# Patient Record
Sex: FEMALE | Race: WHITE | Employment: UNEMPLOYED | URBAN - METROPOLITAN AREA
[De-identification: names, ages, dates, MRNs, and addresses within clinical notes are randomized per-mention and may not be internally consistent; named-entity substitution may affect disease eponyms.]

---

## 2017-08-07 ENCOUNTER — HOSPITAL ENCOUNTER (OUTPATIENT)
Dept: OTHER | Age: 57
Discharge: HOME | End: 2017-08-07
Attending: SURGERY | Admitting: SURGERY

## 2017-08-07 ENCOUNTER — OFFICE VISIT (OUTPATIENT)
Dept: SURGERY | Age: 57
End: 2017-08-07

## 2017-08-07 VITALS — DIASTOLIC BLOOD PRESSURE: 82 MMHG | SYSTOLIC BLOOD PRESSURE: 121 MMHG | HEART RATE: 79 BPM

## 2017-08-07 DIAGNOSIS — N63.20 LEFT BREAST MASS: Primary | ICD-10-CM

## 2017-08-07 DIAGNOSIS — R92.8 ABNORMAL MAMMOGRAM, UNSPECIFIED: ICD-10-CM

## 2017-08-07 PROCEDURE — 99204 OFFICE O/P NEW MOD 45 MIN: CPT | Performed by: SURGERY

## 2017-08-07 RX ORDER — BACITRACIN, NEOMYCIN, POLYMYXIN B 400; 3.5; 5 [USP'U]/G; MG/G; [USP'U]/G
OINTMENT TOPICAL ONCE
Status: COMPLETED | OUTPATIENT
Start: 2017-08-07 | End: 2017-08-07

## 2017-08-07 RX ORDER — LIDOCAINE HYDROCHLORIDE 10 MG/ML
5 INJECTION, SOLUTION EPIDURAL; INFILTRATION; INTRACAUDAL; PERINEURAL ONCE
Status: COMPLETED | OUTPATIENT
Start: 2017-08-07 | End: 2017-08-07

## 2017-08-07 RX ORDER — LIDOCAINE HYDROCHLORIDE AND EPINEPHRINE 20; 5 MG/ML; UG/ML
20 INJECTION, SOLUTION EPIDURAL; INFILTRATION; INTRACAUDAL; PERINEURAL ONCE
Status: COMPLETED | OUTPATIENT
Start: 2017-08-07 | End: 2017-08-07

## 2017-08-07 RX ADMIN — BACITRACIN, NEOMYCIN, POLYMYXIN B: 400; 3.5; 5 OINTMENT TOPICAL at 14:11

## 2017-08-07 RX ADMIN — LIDOCAINE HYDROCHLORIDE 5 ML: 10 INJECTION, SOLUTION EPIDURAL; INFILTRATION; INTRACAUDAL; PERINEURAL at 14:00

## 2017-08-07 RX ADMIN — LIDOCAINE HYDROCHLORIDE AND EPINEPHRINE 20 ML: 20; 5 INJECTION, SOLUTION EPIDURAL; INFILTRATION; INTRACAUDAL; PERINEURAL at 14:00

## 2017-08-07 ASSESSMENT — PAIN SCALES - GENERAL: PAINLEVEL_OUTOF10: 0

## 2017-08-07 ASSESSMENT — ENCOUNTER SYMPTOMS
RESPIRATORY NEGATIVE: 1
EYES NEGATIVE: 1
HEARTBURN: 1

## 2017-08-10 DIAGNOSIS — N63.20 LEFT BREAST MASS: Primary | ICD-10-CM

## 2017-08-11 ENCOUNTER — HOSPITAL ENCOUNTER (OUTPATIENT)
Dept: ULTRASOUND IMAGING | Age: 57
Discharge: HOME | End: 2017-08-11

## 2017-08-11 ENCOUNTER — HOSPITAL ENCOUNTER (OUTPATIENT)
Dept: MAMMOGRAPHY | Age: 57
Discharge: HOME | End: 2017-08-11
Attending: SURGERY | Admitting: SURGERY

## 2017-08-11 DIAGNOSIS — N63.20 LEFT BREAST MASS: ICD-10-CM

## 2017-08-11 DIAGNOSIS — R92.8 ABNORMAL FINDING ON BREAST IMAGING: Primary | ICD-10-CM

## 2017-08-11 DIAGNOSIS — R92.0 ABNORMAL FINDING ON MAMMOGRAPHY, MICROCALCIFICATION: ICD-10-CM

## 2017-08-11 DIAGNOSIS — R92.8 OTHER (ABNORMAL) FINDINGS ON RADIOLOGICAL EXAMINATION OF BREAST: ICD-10-CM

## 2017-08-11 DIAGNOSIS — R92.8 ABNORMAL FINDING ON BREAST IMAGING: ICD-10-CM

## 2017-08-11 RX ORDER — LIDOCAINE HYDROCHLORIDE 10 MG/ML
5 INJECTION, SOLUTION EPIDURAL; INFILTRATION; INTRACAUDAL; PERINEURAL ONCE
Status: COMPLETED | OUTPATIENT
Start: 2017-08-11 | End: 2017-08-11

## 2017-08-11 RX ORDER — BACITRACIN, NEOMYCIN, POLYMYXIN B 400; 3.5; 5 [USP'U]/G; MG/G; [USP'U]/G
OINTMENT TOPICAL ONCE
Status: COMPLETED | OUTPATIENT
Start: 2017-08-11 | End: 2017-08-11

## 2017-08-11 RX ADMIN — LIDOCAINE HYDROCHLORIDE 5 ML: 10 INJECTION, SOLUTION EPIDURAL; INFILTRATION; INTRACAUDAL; PERINEURAL at 11:25

## 2017-08-11 RX ADMIN — BACITRACIN, NEOMYCIN, POLYMYXIN B: 400; 3.5; 5 OINTMENT TOPICAL at 11:40

## 2017-08-17 ENCOUNTER — INITIAL CONSULT (OUTPATIENT)
Dept: SURGERY | Age: 57
End: 2017-08-17

## 2017-08-17 VITALS
DIASTOLIC BLOOD PRESSURE: 76 MMHG | SYSTOLIC BLOOD PRESSURE: 108 MMHG | BODY MASS INDEX: 21.05 KG/M2 | WEIGHT: 131 LBS | HEART RATE: 84 BPM | TEMPERATURE: 98.5 F | HEIGHT: 66 IN

## 2017-08-17 DIAGNOSIS — C50.912 PRIMARY BREAST MALIGNANCY, LEFT (HCC): Primary | ICD-10-CM

## 2017-08-17 DIAGNOSIS — R23.4 BREAST SKIN CHANGES: ICD-10-CM

## 2017-08-17 PROCEDURE — 11100 PR BIOPSY OF SKIN LESION: CPT | Performed by: SURGERY

## 2017-08-17 PROCEDURE — 11101 PR BIOPSY, EACH ADDED LESION: CPT | Performed by: SURGERY

## 2017-08-17 PROCEDURE — 99244 OFF/OP CNSLTJ NEW/EST MOD 40: CPT | Performed by: SURGERY

## 2017-08-17 ASSESSMENT — ENCOUNTER SYMPTOMS
RESPIRATORY NEGATIVE: 1
HEARTBURN: 1
EYES NEGATIVE: 1

## 2017-08-24 ENCOUNTER — TELEPHONE (OUTPATIENT)
Dept: SURGERY | Age: 57
End: 2017-08-24

## 2017-09-21 ENCOUNTER — TELEPHONE (OUTPATIENT)
Dept: SURGERY | Age: 57
End: 2017-09-21

## 2017-09-26 ENCOUNTER — OFFICE VISIT (OUTPATIENT)
Dept: SURGERY | Age: 57
End: 2017-09-26

## 2017-09-26 ENCOUNTER — SURG/PROC ORDERS (OUTPATIENT)
Dept: SURGERY | Age: 57
End: 2017-09-26

## 2017-09-26 VITALS — DIASTOLIC BLOOD PRESSURE: 79 MMHG | HEART RATE: 78 BPM | TEMPERATURE: 98.5 F | SYSTOLIC BLOOD PRESSURE: 123 MMHG

## 2017-09-26 DIAGNOSIS — C50.912 PRIMARY BREAST MALIGNANCY, LEFT (HCC): Primary | ICD-10-CM

## 2017-09-26 PROCEDURE — 99213 OFFICE O/P EST LOW 20 MIN: CPT | Performed by: SURGERY

## 2017-09-26 RX ORDER — SODIUM CHLORIDE 0.9 % (FLUSH) 0.9 %
10 SYRINGE (ML) INJECTION EVERY 12 HOURS SCHEDULED
Status: CANCELLED | OUTPATIENT
Start: 2017-09-26

## 2017-09-26 RX ORDER — LIDOCAINE HYDROCHLORIDE 10 MG/ML
0.1 INJECTION, SOLUTION EPIDURAL; INFILTRATION; INTRACAUDAL; PERINEURAL
Status: CANCELLED | OUTPATIENT
Start: 2017-09-26 | End: 2017-09-26

## 2017-09-26 RX ORDER — SODIUM CHLORIDE, SODIUM LACTATE, POTASSIUM CHLORIDE, CALCIUM CHLORIDE 600; 310; 30; 20 MG/100ML; MG/100ML; MG/100ML; MG/100ML
INJECTION, SOLUTION INTRAVENOUS CONTINUOUS
Status: CANCELLED | OUTPATIENT
Start: 2017-09-26

## 2017-09-26 RX ORDER — SODIUM CHLORIDE 0.9 % (FLUSH) 0.9 %
10 SYRINGE (ML) INJECTION PRN
Status: CANCELLED | OUTPATIENT
Start: 2017-09-26

## 2017-09-26 ASSESSMENT — ENCOUNTER SYMPTOMS
RESPIRATORY NEGATIVE: 1
EYES NEGATIVE: 1
HEARTBURN: 1

## 2017-09-28 ENCOUNTER — TELEPHONE (OUTPATIENT)
Dept: SURGERY | Age: 57
End: 2017-09-28

## 2017-09-28 NOTE — TELEPHONE ENCOUNTER
I spoke to Ms. Moya over the phone regarding hospitalization for mastectomy. She very strongly opposes being hospitalized after surgery because she feels the support at home is most likely sufficient. She feels that after researching breast care, patients should be discharged following mastectomy. We discussed that overnight observation allows us to monitor after a longer surgery and identify any sign of hematoma. Additionally most people require IV pain control after surgery. We discussed that based on how she does, I would consider discharge later in the day with office follow up within 24-48 hours. She did not seem encouraged, however I attempted to re-assure her that my interest was for her safety.     ciara

## 2017-09-29 ENCOUNTER — TELEPHONE (OUTPATIENT)
Dept: SURGERY | Age: 57
End: 2017-09-29

## 2017-10-03 NOTE — TELEPHONE ENCOUNTER
Spoke with patient, she knows to come in 10-5-17 for her 1st day post op. She was given a 8:45 AM appointment to be seen. Patient verbalized understanding.

## 2017-10-04 ENCOUNTER — HOSPITAL ENCOUNTER (OUTPATIENT)
Dept: SURGERY | Age: 57
Discharge: HOME | End: 2017-10-04
Attending: SURGERY | Admitting: SURGERY

## 2017-10-04 VITALS
BODY MASS INDEX: 20.96 KG/M2 | TEMPERATURE: 97.4 F | WEIGHT: 130.4 LBS | OXYGEN SATURATION: 98 % | SYSTOLIC BLOOD PRESSURE: 114 MMHG | HEART RATE: 82 BPM | HEIGHT: 66 IN | RESPIRATION RATE: 16 BRPM | DIASTOLIC BLOOD PRESSURE: 70 MMHG

## 2017-10-04 DIAGNOSIS — C50.912 MALIGNANT NEOPLASM OF LEFT FEMALE BREAST (HCC): ICD-10-CM

## 2017-10-04 DIAGNOSIS — C50.912 MALIGNANT NEOPLASM OF LEFT FEMALE BREAST, UNSPECIFIED SITE OF BREAST: ICD-10-CM

## 2017-10-04 LAB
ABO/RH: NORMAL
ANTIBODY SCREEN: NORMAL

## 2017-10-04 PROCEDURE — 19303 MAST SIMPLE COMPLETE: CPT | Performed by: SURGERY

## 2017-10-04 PROCEDURE — 38900 IO MAP OF SENT LYMPH NODE: CPT | Performed by: SURGERY

## 2017-10-04 PROCEDURE — 38525 BIOPSY/REMOVAL LYMPH NODES: CPT | Performed by: SURGERY

## 2017-10-04 PROCEDURE — 38792 RA TRACER ID OF SENTINL NODE: CPT | Performed by: SURGERY

## 2017-10-04 RX ORDER — SODIUM CHLORIDE 0.9 % (FLUSH) 0.9 %
10 SYRINGE (ML) INJECTION EVERY 12 HOURS SCHEDULED
Status: DISCONTINUED | OUTPATIENT
Start: 2017-10-04 | End: 2017-10-05 | Stop reason: HOSPADM

## 2017-10-04 RX ORDER — MEPERIDINE HYDROCHLORIDE 25 MG/ML
12.5 INJECTION INTRAMUSCULAR; INTRAVENOUS; SUBCUTANEOUS EVERY 5 MIN PRN
Status: DISCONTINUED | OUTPATIENT
Start: 2017-10-04 | End: 2017-10-05 | Stop reason: HOSPADM

## 2017-10-04 RX ORDER — LIDOCAINE HYDROCHLORIDE 10 MG/ML
1 INJECTION, SOLUTION EPIDURAL; INFILTRATION; INTRACAUDAL; PERINEURAL
Status: ACTIVE | OUTPATIENT
Start: 2017-10-04 | End: 2017-10-04

## 2017-10-04 RX ORDER — SODIUM CHLORIDE, SODIUM LACTATE, POTASSIUM CHLORIDE, CALCIUM CHLORIDE 600; 310; 30; 20 MG/100ML; MG/100ML; MG/100ML; MG/100ML
INJECTION, SOLUTION INTRAVENOUS CONTINUOUS
Status: DISCONTINUED | OUTPATIENT
Start: 2017-10-04 | End: 2017-10-05 | Stop reason: HOSPADM

## 2017-10-04 RX ORDER — SODIUM CHLORIDE 0.9 % (FLUSH) 0.9 %
10 SYRINGE (ML) INJECTION PRN
Status: DISCONTINUED | OUTPATIENT
Start: 2017-10-04 | End: 2017-10-05 | Stop reason: HOSPADM

## 2017-10-04 RX ORDER — CLINDAMYCIN PHOSPHATE 900 MG/50ML
900 INJECTION INTRAVENOUS
Status: DISCONTINUED | OUTPATIENT
Start: 2017-10-04 | End: 2017-10-04 | Stop reason: SDUPTHER

## 2017-10-04 RX ORDER — CEFAZOLIN SODIUM 2 G/100ML
2 INJECTION, SOLUTION INTRAVENOUS
Status: COMPLETED | OUTPATIENT
Start: 2017-10-04 | End: 2017-10-04

## 2017-10-04 RX ORDER — LIDOCAINE HYDROCHLORIDE 10 MG/ML
0.1 INJECTION, SOLUTION EPIDURAL; INFILTRATION; INTRACAUDAL; PERINEURAL
Status: ACTIVE | OUTPATIENT
Start: 2017-10-04 | End: 2017-10-04

## 2017-10-04 RX ORDER — OXYCODONE HYDROCHLORIDE AND ACETAMINOPHEN 5; 325 MG/1; MG/1
2 TABLET ORAL PRN
Status: DISCONTINUED | OUTPATIENT
Start: 2017-10-04 | End: 2017-10-04

## 2017-10-04 RX ORDER — OXYCODONE HYDROCHLORIDE 5 MG/1
5 TABLET ORAL
Status: COMPLETED | OUTPATIENT
Start: 2017-10-04 | End: 2017-10-04

## 2017-10-04 RX ORDER — ONDANSETRON 2 MG/ML
4 INJECTION INTRAMUSCULAR; INTRAVENOUS
Status: COMPLETED | OUTPATIENT
Start: 2017-10-04 | End: 2017-10-04

## 2017-10-04 RX ORDER — FENTANYL CITRATE 50 UG/ML
25 INJECTION, SOLUTION INTRAMUSCULAR; INTRAVENOUS EVERY 5 MIN PRN
Status: DISCONTINUED | OUTPATIENT
Start: 2017-10-04 | End: 2017-10-05 | Stop reason: HOSPADM

## 2017-10-04 RX ORDER — HYDROCODONE BITARTRATE AND ACETAMINOPHEN 5; 325 MG/1; MG/1
TABLET ORAL
Qty: 50 TABLET | Refills: 0 | Status: SHIPPED | OUTPATIENT
Start: 2017-10-04 | End: 2020-02-09

## 2017-10-04 RX ORDER — LABETALOL HYDROCHLORIDE 5 MG/ML
5 INJECTION, SOLUTION INTRAVENOUS EVERY 10 MIN PRN
Status: DISCONTINUED | OUTPATIENT
Start: 2017-10-04 | End: 2017-10-05 | Stop reason: HOSPADM

## 2017-10-04 RX ORDER — OXYCODONE HYDROCHLORIDE 5 MG/1
TABLET ORAL
Status: DISCONTINUED
Start: 2017-10-04 | End: 2017-10-05 | Stop reason: HOSPADM

## 2017-10-04 RX ORDER — FENTANYL CITRATE 50 UG/ML
50 INJECTION, SOLUTION INTRAMUSCULAR; INTRAVENOUS EVERY 5 MIN PRN
Status: DISCONTINUED | OUTPATIENT
Start: 2017-10-04 | End: 2017-10-05 | Stop reason: HOSPADM

## 2017-10-04 RX ORDER — HYDROMORPHONE HCL 110MG/55ML
0.25 PATIENT CONTROLLED ANALGESIA SYRINGE INTRAVENOUS EVERY 5 MIN PRN
Status: DISCONTINUED | OUTPATIENT
Start: 2017-10-04 | End: 2017-10-05 | Stop reason: HOSPADM

## 2017-10-04 RX ORDER — OXYCODONE HYDROCHLORIDE AND ACETAMINOPHEN 5; 325 MG/1; MG/1
1 TABLET ORAL PRN
Status: DISCONTINUED | OUTPATIENT
Start: 2017-10-04 | End: 2017-10-04

## 2017-10-04 RX ORDER — HYDROMORPHONE HCL 110MG/55ML
0.5 PATIENT CONTROLLED ANALGESIA SYRINGE INTRAVENOUS EVERY 5 MIN PRN
Status: DISCONTINUED | OUTPATIENT
Start: 2017-10-04 | End: 2017-10-05 | Stop reason: HOSPADM

## 2017-10-04 RX ORDER — SODIUM CHLORIDE 9 MG/ML
INJECTION, SOLUTION INTRAVENOUS CONTINUOUS
Status: DISCONTINUED | OUTPATIENT
Start: 2017-10-04 | End: 2017-10-05 | Stop reason: HOSPADM

## 2017-10-04 RX ADMIN — SODIUM CHLORIDE, SODIUM LACTATE, POTASSIUM CHLORIDE, CALCIUM CHLORIDE: 600; 310; 30; 20 INJECTION, SOLUTION INTRAVENOUS at 08:27

## 2017-10-04 RX ADMIN — OXYCODONE HYDROCHLORIDE 5 MG: 5 TABLET ORAL at 13:00

## 2017-10-04 RX ADMIN — CEFAZOLIN SODIUM 2 G: 2 INJECTION, SOLUTION INTRAVENOUS at 08:27

## 2017-10-04 RX ADMIN — ONDANSETRON 4 MG: 2 INJECTION INTRAMUSCULAR; INTRAVENOUS at 12:00

## 2017-10-04 ASSESSMENT — PAIN DESCRIPTION - DESCRIPTORS: DESCRIPTORS: SORE

## 2017-10-04 ASSESSMENT — PAIN DESCRIPTION - LOCATION: LOCATION: BREAST

## 2017-10-04 ASSESSMENT — PAIN SCALES - GENERAL: PAINLEVEL_OUTOF10: 4

## 2017-10-04 ASSESSMENT — PAIN - FUNCTIONAL ASSESSMENT: PAIN_FUNCTIONAL_ASSESSMENT: 0-10

## 2017-10-04 ASSESSMENT — PAIN DESCRIPTION - ORIENTATION: ORIENTATION: LEFT

## 2017-10-04 NOTE — PROGRESS NOTES
Patient  denies pain and denies needs at this time, friend at bedside, will provide privacy and turn down lights to help rest.  Call bell at bedside.

## 2017-10-04 NOTE — PROGRESS NOTES
Discharge instructions reviewed with patient and spouse. All home medications have been reviewed, questions answered and patient and spouseverbalized understanding. Discharge instructions signed and copies given. Patient discharged  Per w/adryan belongings.

## 2017-10-04 NOTE — ANESTHESIA PRE-OP
[address] [facility] Anesthesiology  Pre-Anesthesia Evaluation/Consultation       Name:  Nydia Geronimo                                         Age:  61 y.o. MRN:    [id number]           Procedure (Scheduled): BREAST MASTECTOMY SENTINEL NODE DISSECTION LEFT  Surgeon:     Dr. Paola Gu MD     No Known Allergies  Patient Active Problem List   Diagnosis    Foot pain    Closed fracture of cuboid bone of foot     Past Medical History:   Diagnosis Date    Arachnoiditis     Arthritis     Cancer ([address]     colon, PRIMARY BREAST     Past Surgical History:   Procedure Laterality Date    BACK SURGERY       SECTION      CHOLECYSTECTOMY      COLON SURGERY       Social History   Substance Use Topics    Smoking status: Current Every Day Smoker     Packs/day: 1.00     Types: Cigarettes    Smokeless tobacco: Never Used      Comment: try to get. smokes 1 a day, not even a pack a day    Alcohol use Yes      Comment: OCCASIONAL GLASS OF WINE       Prior to Admission medications    Medication Sig Start Date End Date Taking?  Authorizing Provider   ibuprofen (ADVIL;MOTRIN) 800 MG tablet Take 800 mg by mouth as needed for Pain    Historical Provider, MD       Current Outpatient Prescriptions   Medication Sig Dispense Refill    ibuprofen (ADVIL;MOTRIN) 800 MG tablet Take 800 mg by mouth as needed for Pain       Current Facility-Administered Medications   Medication Dose Route Frequency Provider Last Rate Last Dose    HYDROmorphone (DILAUDID) injection 0.25 mg  0.25 mg Intravenous Q5 Min PRN Tresa Pack MD        HYDROmorphone (DILAUDID) injection 0.5 mg  0.5 mg Intravenous Q5 Min PRN Tresa Pack MD        fentaNYL (SUBLIMAZE) injection 25 mcg  25 mcg Intravenous Q5 Min PRN Tresa Pack MD        fentaNYL (SUBLIMAZE) injection 50 mcg  50 mcg Intravenous Q5 Min PRN Tresa Pack MD        oxyCODONE-acetaminophen (PERCOCET) 5-325 MG per tablet 1 tablet  1 tablet Oral PRN Tresa Pack MD        Or   Richie Gonzalez oxyCODONE-acetaminophen (PERCOCET) 5-325 MG per tablet 2 tablet  2 tablet Oral PRN Rupinder Fuller MD        ondansetron [facility]) injection 4 mg  4 mg Intravenous Once PRN Rupinder Fuller MD        labetalol (NORMODYNE;TRANDATE) injection 5 mg  5 mg Intravenous Q10 Min PRN Rupinder Fuller MD        meperidine (DEMEROL) injection 12.5 mg  12.5 mg Intravenous Q5 Min PRN Rupinder Fuller MD           Vital Signs  (Current)   Vitals:    10/04/17 0748   BP: 110/69   Pulse: 75   Resp: 14   Temp: 98.6 °F (37 °C)   SpO2: 96%     (for past 48 hrs)  BP  Min: 110/69   Min taken time: 10/04/17 0748  Max: 110/69   Max taken time: 10/04/17 0748  Temp  Av.6 °F (37 °C)  Min: 98.6 °F (37 °C)   Min taken time: 10/04/17 0748  Max: 98.6 °F (37 °C)   Max taken time: 10/04/17 0748  Pulse  Av  Min: 76   Min taken time: 10/04/17 0748  Max: 76   Max taken time: 10/04/17 0748  Resp  Av  Min: 15   Min taken time: 10/04/17 0748  Max: 14   Max taken time: 10/04/17 0748  SpO2  Av %  Min: 96 %   Min taken time: 10/04/17 0748  Max: 96 %   Max taken time: 10/04/17 0748  (last three values)   BP Readings from Last 3 Encounters:   10/04/17 110/69   17 123/79   17 108/76       CBC  Lab Results   Component Value Date    WBC 6.5 2011    RBC 5.00 2011    HGB 15.8 2011    HCT 46.2 2011    MCV 92.5 2011    RDW 12.8 2011     2011       CMP    Lab Results   Component Value Date     2011    K 4.1 2011     2011    CO2 28 2011    BUN 13 2011    CREATININE 0.6 2011    GFRAA >60 2011    GLUCOSE 114 2011    CALCIUM 9.8 2011       BMP    Lab Results   Component Value Date     2011    K 4.1 2011     2011    CO2 28 2011    BUN 13 2011    CREATININE 0.6 2011    CALCIUM 9.8 2011    GFRAA >60 2011    GLUCOSE 114 2011       Coags   No results found for: PROTIME, INR, APTT    HCG (If

## 2017-10-04 NOTE — IP AVS SNAPSHOT
Patient Information     Patient Name ALMA Stevens 1960         This is your updated medication list to keep with you all times      TAKE these medications     HYDROcodone-acetaminophen 5-325 MG per tablet   Commonly known as:  NORCO   Take 1-2 tabs every 4-6 hours as needed for pain. .   Notes to Patient:  TAKE WITH FOOD, DO NOT MIX WITH ALCOHOL, DO NOT DRIVE WHEN TAKING THIS MEDICATION. MAY CAUSE CONSTIPATION, MAY TAKE A MILD STOOL SOFTENER           ASK your doctor about these medications     ibuprofen 800 MG tablet   Commonly known as:  ADVIL;MOTRIN   Notes to Patient:  STOP taking this medication for 3-4 days.

## 2017-10-04 NOTE — PROGRESS NOTES
Teaching / education initiated regarding perioperative experience, expectations, and pain management during stay. Patient verbalized understanding.

## 2017-10-04 NOTE — OP NOTE
minutes of the incision. Breast imaging was available in the room. After induction of general anesthesia, the appropriate World Health Organization timeout procedure was performed. At 3876 0.5 millicuries of Lymphoseek technetium-99 sulfur colloid was injected intradermally in a periareolar location at 12:00, 3:00, 6:00, and 9:00. A neoprobe was then used to identify a hot spot. The location was marked. This initial count was 8. After this 5 mL of  isosulfuran blue dye for lymphatic mapping was injected intraparenchymally at 0850. A five minute massage was performed. Blue dye was visible mapping. The left breast and axillary region, upper arm, and chest wall were prepped and draped in the normal sterile fashion. The skin and soft tissues over the proposed mastectomy incisions was infiltrated with local. An incision was made in an elliptical fashion to include the nipple areolar complex and the central breast skin. Dissection was carried through the subcutaneous tissues and hemostasis obtained. Flaps were then raised superiorly to the level of the clavicle, medially to the sternum, laterally to the latissimus dorsi muscle, and inferiorly to the inframammary crease. Perforating vessels were clipped or preserved as they were identified. After flaps are elevated and dissection carried to the chest wall, the breast and fascia are removed from the pectoralis muscle from superior/medial to inferior/lateral. Once freed, the lateral portion of the specimen is survielled with the neoprobe for sentinel lymph nodes. None were identified. The specimen is oriented with a short stitch superiorly and a long stitch laterally. Medial skin was removed for closure purposes. This was attached anatomically to the breast specimen for evaluation. Of note, the malignant mass did appear quite large. The superior and inferior skin margins were taken as far as possible to allow for skin closure. Flaps were thin.   The region of applied. She was awakened from anesthesia and placed in a surgical binder. She was taken to the recovery room in stable condition. She requested no findings be discussed with her family/friends. All sentinel nodes and breast specimens were sent to pathology for review. Instrument, sponge, and needle counts were correct.       Electronically signed by Viktoriya Hoffman MD on 10/4/17 at [phone] AM

## 2017-10-04 NOTE — PROGRESS NOTES
Patient nausea improved, vss on room air, patient eating ice chips, denies pain at this time. Placed in phase two care. Dr. Laquita Harris gave instructions to hold patient for a few hours before discharge.

## 2017-10-04 NOTE — PROGRESS NOTES
Patient arrived to pacu, tachycardia but otherwise VS are stable on nasal cannula at 3lpm, patient tearful, denies pain, skin hot and moist, removed plastic gown and place cloth gown on, removed blankets, as patient reports feels \"hot\".  Comfort given, will continue to monitor

## 2017-10-04 NOTE — IP AVS SNAPSHOT
After Visit Summary  (Discharge Instructions)    Medication List for Home    Based on the information you provided to us as well as any changes during this visit, the following is your updated medication list.  Compare this with your prescription bottles at home. If you have any questions or concerns, contact your primary care physician's office. Daily Medication List (This medication list can be shared with any healthcare provider who is helping you manage your medications)      There are NEW medications for you. START taking them after you leave the hospital        Last Dose    Next Dose Due AM NOON PM NIGHT    HYDROcodone-acetaminophen 5-325 MG per tablet   Commonly known as:  NORCO   Take 1-2 tabs every 4-6 hours as needed for pain. .   Notes to Patient:  TAKE WITH FOOD, DO NOT MIX WITH ALCOHOL, DO NOT DRIVE WHEN TAKING THIS MEDICATION. MAY CAUSE CONSTIPATION, MAY TAKE A MILD STOOL SOFTENER                    After 3pm, you had tylenol in your IV this morning                           ASK your doctor about these medications if you have questions        Last Dose    Next Dose Due AM NOON PM NIGHT    ibuprofen 800 MG tablet   Commonly known as:  ADVIL;MOTRIN   Take 800 mg by mouth as needed for Pain   Notes to Patient:  STOP taking this medication for 3-4 days. Where to Get Your Medications      You can get these medications from any pharmacy     Bring a paper prescription for each of these medications     HYDROcodone-acetaminophen 5-325 MG per tablet               Allergies as of 10/4/2017     No Known Allergies      Immunizations as of 10/4/2017     Name Date Dose VIS Date Route    Tdap (Boostrix, Adacel) 8/7/2015 0.5 mL 2/24/2015 Intramuscular      Last Vitals          Most Recent Value    Temp  98.2 °F (36.8 °C)    Pulse  73    Resp  14    BP  113/73         After Visit Summary    This summary was created for you. sign-up page. 5. Create a [employer]St ID. This will be your My[employer]Platform login ID and cannot be changed, so think of one that is secure and easy to remember. 6. Create a [employer]St password. You can change your password at any time. 7. Enter your Password Reset Question and Answer. This can be used at a later time if you forget your password. 8. Enter your e-mail address. You will receive e-mail notification when new information is available in [address] 9. Click Sign Up. You can now view your medical record. Additional Information  If you have questions, please contact the physician practice where you receive care. Remember, [employer]Platform is NOT to be used for urgent needs. For medical emergencies, dial 911. For questions regarding your [employer] account call [phone]. If you have a clinical question, please call your doctor's office. View your information online  ? Review your current list of  medications, immunization, and allergies. ? Review your future test results online . ? Review your discharge instructions provided by your caregivers at discharge    Certain functionality such as prescription refills, scheduling appointments or sending messages to your provider are not activated if your provider does not use [employer] in his/her office    For questions regarding your [employer] account call [phone]. If you have a clinical question, please call your doctor's office. The information on all pages of the After Visit Summary has been reviewed with me, the patient and/or responsible adult, by my health care provider(s). I had the opportunity to ask questions regarding this information. I understand I should dispose of my armband safely at home to protect my health information. A complete copy of the After Visit Summary has been given to me, the patient and/or responsible adult.            Patient Signature/Responsible Adult:____________________    Clinician Signature:_____________________ Date:_____________________    Time:_____________________        More Information           Surgical Drain Care: Care Instructions  What is a surgical drain? After a surgery, fluid may collect inside your body in the surgical area. This makes an infection or other problems more likely. A surgical drain allows the fluid to flow out. The doctor will put a thin rubber tube into the area of your body where the fluid is likely to collect. The rubber tube will carry the fluid outside your body. The most common type of surgical drain carries the fluid into a collection bulb that you empty. This is called a Brian-Saucedo drain. The drain uses suction created by the bulb to pull the fluid from your body into the bulb. The rubber tube will probably be held in place by one or two stitches in your skin. Most people attach the bulb with a safety pin to clothing or near the bandage so that it doesn't flip around or pull on the stitches. When you first get the drain, the fluid will be bloody. It will change color from red to pink to a light yellow or clear as the wound heals and the fluid starts to go away. Your doctor may give you specific information on when you no longer need the drain and when it will be removed. In general, you will need the drain until you are collecting less than about 2 tablespoons of fluid in 24 hours. Follow-up care is a key part of your treatment and safety. Be sure to make and go to all appointments, and call your doctor if you are having problems. It's also a good idea to know your test results and keep a list of the medicines you take. How can you care for yourself at home? Fluid collection  Follow any instructions your doctor gives you. How often you empty the bulb depends on how much fluid is draining. Empty the bulb when it is half full. To empty the bulb:  · Wash your hands with soap and water. · Take the plug out of the bulb.

## 2017-10-04 NOTE — PROGRESS NOTES
Shift relief report given to Amanda Blankenship RN at bedside. Patient denies pain or needs at this time, friend at bedside. vss on room air.

## 2017-10-05 ENCOUNTER — OFFICE VISIT (OUTPATIENT)
Dept: SURGERY | Age: 57
End: 2017-10-05

## 2017-10-05 VITALS — DIASTOLIC BLOOD PRESSURE: 83 MMHG | HEART RATE: 74 BPM | TEMPERATURE: 98.2 F | SYSTOLIC BLOOD PRESSURE: 138 MMHG

## 2017-10-05 DIAGNOSIS — C50.912 PRIMARY BREAST MALIGNANCY, LEFT (HCC): Primary | ICD-10-CM

## 2017-10-05 PROCEDURE — 99024 POSTOP FOLLOW-UP VISIT: CPT | Performed by: SURGERY

## 2017-10-05 NOTE — PROGRESS NOTES
PCP:  Medical Oncology: Izzy  Radiation:  Other:    [id number] STAGE:  IIA left breast cancer      Ms. Wesley Schulte is a 61y.o.-year-old woman who is now s/p left total mastectomy with sentinel lymph node biospy for left breast cancer. She underwent this procedure on 10/4/2017 and tolerated it well. She presents to the office today for evaluation after surgery, POD#1. She has previously declined neoadjuvant chemotherapy. INTERVAL HISTORY:  On 8/7/2017 she underwent a diagnostic left breast ultrasound. She declined mammography at this time due to symptoms in the left breast. There is a 3 x 2.6 x 3.5 cm focal mass in the 3:00 position and 2 cm from the nipple. This is identified in the location of her prior breast trauma. Appears homogenous and slightly hypoechoic, not convincingly consistent with a hematoma. A solid mass does appear to be vascular. Prominent lymph nodes are demonstrated within the left axilla. Biopsy was recommended at this time. Completion mammography is also recommended. BI-RADS 4.     On 8/7/2017 she underwent a core needle biopsy of the left breast mass. Pathology identified grade 3 invasive ductal carcinoma. ER low positive (5%) OR negative HER-2 negative. [id number].     On 8/11/2017 she underwent bilateral diagnostic mammography. The prior left breast biopsy clip appears to be in the correct location. There are pleomorphic calcifications posterior to the mass in the upper outer quadrant. Within the right breast there are diffuse indeterminate calcifications which are recommended for biopsy. BI-RADS 4.     On 8/11/2017 she underwent a left axillary biopsy of the prominent lymph nodes. Benign tissue was identified with no evidence of atypia or malignancy. [id number].     On 8/11/2017 he underwent core needle biopsy of the right breast calcifications. Within the lateral breast pathology identified benign tissue without evidence of atypia or malignancy.  Within the retroareolar position pathology identified benign tissue with no evidence of atypia or malignancy. These were considered concordant results. [id number].     On 8/17/2017 she underwent a punch biopsy of her skin changes. This was negative for malignancy. [id number]. On 10/4/2017 she underwent a left total mastectomy with sentinel lymph node biopsy. Pathology is pending at this time. Pathology:  Pending.       Family History:  Sister: Breast cancer diagnosed age 48  Sister: Breast cancer diagnosed age 64  No additional family history of breast or ovarian cancer. Exam:  General: no acute distress  Breast: There is a well healing scar on the  left breast. Her surgical glue and dressings are in place. Her FREDO drain is in place with serosang drainage. ~ 50-80mL since discharged from the hospital. There is no signs of infection or hematoma. Skin flaps are viable. She has appropriate tenderness to palpation. She has moderate range of motion with her arm. Respiratory: respirations are non-labored and there is no audible distress  Cardiovascular: regular rate, extremities appear well perfused  Neurologic: alert, oriented      Assessment/Plan:  [id number] STAGE:  IIA left breast cancer  ER low positive DE- HER2 negative. S/p left total mastectomy with SLNB    Her pathology results are unavailable at this time. Postoperatively (and preoperatively) I did discuss the tumor:breast ratio and my concerns on the extent of her disease. (declined chemo)  I reviewed concerns on risk of positive mastectomy margin, in which case I'd recommend PMRT. We discussed now there is no further breast tissue to remove at this point. I also discussed that some of her lymph nodes appeared enlarged, however that is what originally prompted her LN biopsy - ultimately benign (preop N0). I thoroughly reviewed this pathology would influence potential surgical recommendations as well. She verbalized understanding.   Her case was discussed at our multidisciplinary cancer conference with agreement of all (including neoadjuvant chemo and likely PMRT) recommendations. Continue pain control as needed with oral medication    Diet as tolerated    Local wound care discussed. On Q pump to be removed tomorrow. Indications for drain removal reviewed. She will call office if ready to be removed (<30ml/d x 2 days) before her follow up. No lotions, creams etc to wound. Showering questions answered. Discussed range of motion exercises. We reviewed discharge instructions and wound care once again. Discussed surgical bra wearing. We will have her follow up as scheduled at her post op appointment. She understands to call with any questions or concerns regarding her post op care. I will contact when path is available. All of the patient's questions were answered at this time, but she was encouraged to call the office with any further inquiries.

## 2017-10-05 NOTE — MR AVS SNAPSHOT
After Visit Summary             Emry Aver   10/5/2017 8:45 AM   Office Visit    Description:  Female : 1960   Provider:  Pablito Taylor MD   Department:  [facility] Breast Surgery              Your Follow-Up and Future Appointments         Below is a list of your follow-up and future appointments. This may not be a complete list as you may have made appointments directly with providers that we are not aware of or your providers may have made some for you. Please call your providers to confirm appointments. It is important to keep your appointments. Please bring your current insurance card, photo ID, co-pay, and all medication bottles to your appointment. If self-pay, payment is expected at the time of service. Your To-Do List     Future Appointments Provider Department Dept Phone    10/16/2017 2:30 PM Pablito Taylor MD [facility] Breast Surgery [phone]         Information from Your Visit        Department     Name Address Phone Fax    [facility] Breast Surgery [address] [phone]      Vital Signs     Blood Pressure Pulse Temperature Last Menstrual Period Breastfeeding? Smoking Status    138/83 (Site: Right Arm, Position: Sitting, Cuff Size: Medium Adult) 74 98.2 °F (36.8 °C) 2011 Yes Current Every Day Smoker         Medications and Orders      Your Current Medications Are              HYDROcodone-acetaminophen (NORCO) 5-325 MG per tablet Take 1-2 tabs every 4-6 hours as needed for pain. Kaylah Prows     ibuprofen (ADVIL;MOTRIN) 800 MG tablet Take 800 mg by mouth as needed for Pain      Allergies           No Known Allergies         Additional Information        Basic Information     Date Of Birth Sex Race Ethnicity Preferred Language    1960 Female White Non-/Non  English      Problem List as of 10/5/2017  Date Reviewed: 2014 (sofia/divine/yyyy) as indicated and click Submit. You will be taken to the next sign-up page. 5. Create a Ofuz ID. This will be your Of[employer] login ID and cannot be changed, so think of one that is secure and easy to remember. 6. Create a Ofuz password. You can change your password at any time. 7. Enter your Password Reset Question and Answer. This can be used at a later time if you forget your password. 8. Enter your e-mail address. You will receive e-mail notification when new information is available in [address] 9. Click Sign Up. You can now view your medical record. Additional Information  If you have questions, please contact the physician practice where you receive care. Remember, [employer] is NOT to be used for urgent needs. For medical emergencies, dial 911. For questions regarding your Of[employer] account call [phone]. If you have a clinical question, please call your doctor's office.

## 2017-10-09 NOTE — ANESTHESIA POST-OP
Anesthesia Post-op Note    Patient: Bebeto Gibson  MRN: [id number]  YOB: 1960  Date of evaluation: 10/9/2017  Time:  10:18 AM     Procedure(s) Performed:     Last Vitals: /70   Pulse 82   Temp 97.4 °F (36.3 °C) (Temporal)   Resp 16   Ht 5' 6\" (1.676 m)   Wt 130 lb 6.4 oz (59.1 kg)   LMP 06/01/2011   SpO2 98%   BMI 21.05 kg/m²     Phil Phase I: Phil Score: 10    Phil Phase II: Phil Score: 10    Anesthesia Post Evaluation    Final anesthesia type: general  Patient location during evaluation: PACU  Patient participation: complete - patient participated  Level of consciousness: awake and alert  Airway patency: patent  Nausea & Vomiting: no nausea and no vomiting  Complications: no  Cardiovascular status: hemodynamically stable  Respiratory status: acceptable  Hydration status: euvolemic        Dylan Lynch MD  10:18 AM

## 2017-10-10 ENCOUNTER — TELEPHONE (OUTPATIENT)
Dept: SURGERY | Age: 57
End: 2017-10-10

## 2017-10-10 ENCOUNTER — NURSE ONLY (OUTPATIENT)
Dept: SURGERY | Age: 57
End: 2017-10-10

## 2017-10-10 DIAGNOSIS — Z98.890 POST-OPERATIVE STATE: Primary | ICD-10-CM

## 2017-10-10 NOTE — TELEPHONE ENCOUNTER
Patient has had less than 30cc's of drainage for the past 3 consecutive days. Will come in to have drain removed today.

## 2017-10-11 VITALS — DIASTOLIC BLOOD PRESSURE: 88 MMHG | HEART RATE: 72 BPM | TEMPERATURE: 98.2 F | SYSTOLIC BLOOD PRESSURE: 132 MMHG

## 2017-10-11 NOTE — PROGRESS NOTES
Patient came in to office today to have Annika Mouse removed after 3 consecutive days of less than 20cc of drainage,. Drain incision site clean and dry without redness or purulent drainage noted. Area cleansed with alcohol prep pad and suture was removed. All suture was accounted for. FREDO Drain roved without difficulty. White tip intact. After drain was removed serous fluid flowed from the drain incision site. Drainage was caught in several  4X4's. Pressure dressing applied to area. Patient instructed to call office in the morning if area was draining more than usual. Patient verbalized understanding. Dr. Inocencia Lee informed.

## 2017-10-16 ENCOUNTER — OFFICE VISIT (OUTPATIENT)
Dept: SURGERY | Age: 57
End: 2017-10-16

## 2017-10-16 DIAGNOSIS — C50.912 PRIMARY BREAST MALIGNANCY, LEFT (HCC): Primary | ICD-10-CM

## 2017-10-16 PROCEDURE — 99024 POSTOP FOLLOW-UP VISIT: CPT | Performed by: SURGERY

## 2017-10-16 NOTE — PROGRESS NOTES
PCP:  Medical Oncology: michela  Radiation:  Other:      pT3N0 Mx STAGE:  IIB left breast cancer      Ms. Nasrin Andrade is a 61y.o.-year-old woman who is now s/p left total mastectomy with sentinel lymph node biospy for left breast cancer. She underwent this procedure on 10/4/17 and tolerated it well. She is doing quite well postoperatively and her pain is continuing to improve. INTERVAL HISTORY:  On 8/7/2017 she underwent a diagnostic left breast ultrasound. She declined mammography at this time due to symptoms in the left breast. There is a 3 x 2.6 x 3.5 cm focal mass in the 3:00 position and 2 cm from the nipple. This is identified in the location of her prior breast trauma. Appears homogenous and slightly hypoechoic, not convincingly consistent with a hematoma. A solid mass does appear to be vascular. Prominent lymph nodes are demonstrated within the left axilla. Biopsy was recommended at this time. Completion mammography is also recommended. BI-RADS 4.     On 8/7/2017 she underwent a core needle biopsy of the left breast mass. Pathology identified grade 3 invasive ductal carcinoma. ER low positive (5%) AL negative HER-2 negative. [id number].     On 8/11/2017 she underwent bilateral diagnostic mammography. The prior left breast biopsy clip appears to be in the correct location. There are pleomorphic calcifications posterior to the mass in the upper outer quadrant. Within the right breast there are diffuse indeterminate calcifications which are recommended for biopsy. BI-RADS 4.     On 8/11/2017 she underwent a left axillary biopsy of the prominent lymph nodes. Benign tissue was identified with no evidence of atypia or malignancy. [id number].     On 8/11/2017 he underwent core needle biopsy of the right breast calcifications. Within the lateral breast pathology identified benign tissue without evidence of atypia or malignancy.  Within the retroareolar position pathology identified benign tissue with no evidence of atypia or malignancy. These were considered concordant results. [id number].     On 8/17/2017 she underwent a punch biopsy of her skin changes.  This was negative for malignancy. [id number].     On 10/4/2017 she underwent a left total mastectomy with sentinel lymph node biopsy. Pathology identified 8.5 cm of grade 3 invasive ductal carcinoma with DCIS. ER low positive OK negative HER-2 negative. There is a 10% metaplastic complement. DCIS invades the lactiferous ducts of the nipple. Pagets disease is not present. The dermis shows chronic inflammation but dermal intralymphatic carcinoma is not identified. Margins are negative. There are 0/5 lymph nodes involved carcinoma. [id number].       Pathology:    Department of Pathology  FINAL SURGICAL PATHOLOGY REPORT  Patient Name:  REMA PADILLA              Accession No:  [id number]          FINAL DIAGNOSIS:    A Left breast, simple mastectomy, showing  * Invasive poorly differentiated ductal carcinoma with metaplastic  component and high grade ductal                    carcinoma in situ     -  Nipple with high grade ductal carcinoma in situ in lactiferous       ducts, no evidence of Paget's disease  *  Skin with dermal chronic inflammation and lymphangiectasia, no  evidence of intralymphatic carcinoma     -  Chronic mastitis  B [place] lymph node #1  * One benign lymph node  * Metastatic carcinoma is not identified with serial H&E stain or  immunoperoxidase for pan-keratin  C [place] lymph node #2  * One benign lymph node  * Metastatic carcinoma is not identified with serial H&E stain or  immunoperoxidase for pan-keratin  D [place] lymph node #3  * One benign lymph node  * Metastatic carcinoma is not identified with serial H&E stain or  immunoperoxidase for pan-keratin  E [place] lymph node #4  * One benign lymph node                Metastatic carcinoma is not identified with serial H&E  stain or immunoperoxidase for pan-keratin  F [place] breast  Dermal lymphovascular invasion-  not identified  Pathologic staging- pT3, pN0  Ancillary studies performed on previous needle biopsy, see report  [facility]-  Comments:  -In limited (10%) areas this poorly differentiated invasive ductal  carcinoma acquires features of squamous differentiation. The neoplasm in  these areas is characterized by abundant eosinophilic/ glassy cytoplasm,  discrete cell borders and intercellular bridges. -A prominent chronic mastitis is present in the breast tissue surrounding  the tumor, and the dermis shows chronic inflammation and lympangiectasia. Dermal intralymphatic carcinoma is not detected. -The sentinel lymph nodes are enlarged and show benign reactive changes. ROLDO/ROLDO    Exam:  General: no acute distress  Breast: There is a well healing scar on the  left breast. There is no active drainage or signs of infection. There is no ecchymosis or apparent hematomas. There is a small seroma present which is not bothersome or tender. She has good range of motion with her arm. Respiratory: respirations are non-labored and there is no audible distress  Cardiovascular: regular rate, extremities appear well perfused  Neurologic: alert, oriented      Assessment/Plan:  [id number]  STAGE:  IIB left breast cancer  ER low positive NM negative HER-2 negative  S/p left mastectomy with SLNB    Her pathology results were reviewed with her in detail today. She was provided a copy of her pathology report for her records. Systemic therapy was reviewed. We reviewed that radiation therapy is at times recommended in some patients undergoing mastectomies to reduce the risk of local recurrence. We have discussed risk of recurrence based on her pathology. I'm be very concerned for her risk without further therapy. However, she verbalizes understanding and has declined further adjuvant treatment.   I encouraged her to follow up with medical oncology as well as us for surveillance. At this time she can resume normal activity and wearing her regular bras. She should be fully healed in another 6 weeks at which time she can begin massage with lotion to soften scar tissue. I encouraged her to continue self breast evaluation. Follow up surveillance was discussed. Our plan at this time is to follow up with surgical breast oncology office in 3 months. At that time we can re-evaluate her and set her up for contralateral breast imaging. All of the patient's questions were answered at this time, but she was encouraged to call the office with any further inquiries.

## 2020-02-09 ENCOUNTER — APPOINTMENT (OUTPATIENT)
Dept: CT IMAGING | Age: 60
DRG: 181 | End: 2020-02-09
Payer: COMMERCIAL

## 2020-02-09 ENCOUNTER — APPOINTMENT (OUTPATIENT)
Dept: GENERAL RADIOLOGY | Age: 60
DRG: 181 | End: 2020-02-09
Payer: COMMERCIAL

## 2020-02-09 ENCOUNTER — HOSPITAL ENCOUNTER (INPATIENT)
Age: 60
LOS: 2 days | Discharge: ANOTHER HOSPITAL | DRG: 181 | End: 2020-02-11
Attending: EMERGENCY MEDICINE | Admitting: INTERNAL MEDICINE
Payer: COMMERCIAL

## 2020-02-09 DIAGNOSIS — R53.83 MALAISE AND FATIGUE: ICD-10-CM

## 2020-02-09 DIAGNOSIS — R53.81 MALAISE AND FATIGUE: ICD-10-CM

## 2020-02-09 DIAGNOSIS — C78.00 MALIGNANT NEOPLASM METASTATIC TO LUNG, UNSPECIFIED LATERALITY (HCC): ICD-10-CM

## 2020-02-09 DIAGNOSIS — R53.1 GENERALIZED WEAKNESS: ICD-10-CM

## 2020-02-09 DIAGNOSIS — D64.9 ANEMIA, UNSPECIFIED TYPE: ICD-10-CM

## 2020-02-09 DIAGNOSIS — C34.92 BRONCHOGENIC CARCINOMA OF LEFT LUNG (HCC): Primary | ICD-10-CM

## 2020-02-09 LAB
A/G RATIO: 0.8 (ref 1.1–2.2)
ALBUMIN SERPL-MCNC: 3.2 G/DL (ref 3.4–5)
ALP BLD-CCNC: 106 U/L (ref 40–129)
ALT SERPL-CCNC: 7 U/L (ref 10–40)
ANION GAP SERPL CALCULATED.3IONS-SCNC: 14 MMOL/L (ref 3–16)
AST SERPL-CCNC: 13 U/L (ref 15–37)
BASOPHILS ABSOLUTE: 0.1 K/UL (ref 0–0.2)
BASOPHILS RELATIVE PERCENT: 0.9 %
BILIRUB SERPL-MCNC: <0.2 MG/DL (ref 0–1)
BUN BLDV-MCNC: 14 MG/DL (ref 7–20)
C DIFF TOXIN/ANTIGEN: NORMAL
CALCIUM SERPL-MCNC: 8.8 MG/DL (ref 8.3–10.6)
CHLORIDE BLD-SCNC: 99 MMOL/L (ref 99–110)
CO2: 20 MMOL/L (ref 21–32)
CREAT SERPL-MCNC: <0.5 MG/DL (ref 0.6–1.2)
EOSINOPHILS ABSOLUTE: 0.1 K/UL (ref 0–0.6)
EOSINOPHILS RELATIVE PERCENT: 0.8 %
GFR AFRICAN AMERICAN: >60
GFR NON-AFRICAN AMERICAN: >60
GLOBULIN: 3.9 G/DL
GLUCOSE BLD-MCNC: 185 MG/DL (ref 70–99)
HCT VFR BLD CALC: 34.3 % (ref 36–48)
HCT VFR BLD CALC: 34.4 % (ref 36–48)
HEMOGLOBIN: 10.6 G/DL (ref 12–16)
IMMATURE RETIC FRACT: 0.47 (ref 0.21–0.37)
INR BLD: 1.22 (ref 0.86–1.14)
LACTIC ACID, SEPSIS: 1.6 MMOL/L (ref 0.4–1.9)
LYMPHOCYTES ABSOLUTE: 1.3 K/UL (ref 1–5.1)
LYMPHOCYTES RELATIVE PERCENT: 15.8 %
MAGNESIUM: 2 MG/DL (ref 1.8–2.4)
MCH RBC QN AUTO: 24.3 PG (ref 26–34)
MCHC RBC AUTO-ENTMCNC: 30.6 G/DL (ref 31–36)
MCV RBC AUTO: 79.5 FL (ref 80–100)
MONOCYTES ABSOLUTE: 0.6 K/UL (ref 0–1.3)
MONOCYTES RELATIVE PERCENT: 7.4 %
NEUTROPHILS ABSOLUTE: 6.3 K/UL (ref 1.7–7.7)
NEUTROPHILS RELATIVE PERCENT: 75.1 %
OCCULT BLOOD DIAGNOSTIC: NORMAL
PDW BLD-RTO: 15.6 % (ref 12.4–15.4)
PLATELET # BLD: 530 K/UL (ref 135–450)
PMV BLD AUTO: 6.7 FL (ref 5–10.5)
POTASSIUM REFLEX MAGNESIUM: 3.4 MMOL/L (ref 3.5–5.1)
PRO-BNP: 221 PG/ML (ref 0–124)
PROTHROMBIN TIME: 14.2 SEC (ref 10–13.2)
RBC # BLD: 4.33 M/UL (ref 4–5.2)
RETICULOCYTE ABSOLUTE COUNT: 0.06 M/UL (ref 0.02–0.1)
RETICULOCYTE COUNT PCT: 1.31 % (ref 0.5–2.18)
SODIUM BLD-SCNC: 133 MMOL/L (ref 136–145)
TOTAL PROTEIN: 7.1 G/DL (ref 6.4–8.2)
TROPONIN: <0.01 NG/ML
WBC # BLD: 8.4 K/UL (ref 4–11)

## 2020-02-09 PROCEDURE — 6370000000 HC RX 637 (ALT 250 FOR IP): Performed by: PHYSICIAN ASSISTANT

## 2020-02-09 PROCEDURE — 85025 COMPLETE CBC W/AUTO DIFF WBC: CPT

## 2020-02-09 PROCEDURE — 83735 ASSAY OF MAGNESIUM: CPT

## 2020-02-09 PROCEDURE — 87449 NOS EACH ORGANISM AG IA: CPT

## 2020-02-09 PROCEDURE — 83550 IRON BINDING TEST: CPT

## 2020-02-09 PROCEDURE — 6360000002 HC RX W HCPCS: Performed by: INTERNAL MEDICINE

## 2020-02-09 PROCEDURE — C9113 INJ PANTOPRAZOLE SODIUM, VIA: HCPCS | Performed by: INTERNAL MEDICINE

## 2020-02-09 PROCEDURE — 80053 COMPREHEN METABOLIC PANEL: CPT

## 2020-02-09 PROCEDURE — 83540 ASSAY OF IRON: CPT

## 2020-02-09 PROCEDURE — 82728 ASSAY OF FERRITIN: CPT

## 2020-02-09 PROCEDURE — 36415 COLL VENOUS BLD VENIPUNCTURE: CPT

## 2020-02-09 PROCEDURE — 83880 ASSAY OF NATRIURETIC PEPTIDE: CPT

## 2020-02-09 PROCEDURE — 99285 EMERGENCY DEPT VISIT HI MDM: CPT

## 2020-02-09 PROCEDURE — 83605 ASSAY OF LACTIC ACID: CPT

## 2020-02-09 PROCEDURE — 71045 X-RAY EXAM CHEST 1 VIEW: CPT

## 2020-02-09 PROCEDURE — 85610 PROTHROMBIN TIME: CPT

## 2020-02-09 PROCEDURE — 93005 ELECTROCARDIOGRAM TRACING: CPT | Performed by: PHYSICIAN ASSISTANT

## 2020-02-09 PROCEDURE — 1200000000 HC SEMI PRIVATE

## 2020-02-09 PROCEDURE — 74177 CT ABD & PELVIS W/CONTRAST: CPT

## 2020-02-09 PROCEDURE — 87324 CLOSTRIDIUM AG IA: CPT

## 2020-02-09 PROCEDURE — 71260 CT THORAX DX C+: CPT

## 2020-02-09 PROCEDURE — 6360000004 HC RX CONTRAST MEDICATION: Performed by: PHYSICIAN ASSISTANT

## 2020-02-09 PROCEDURE — 2580000003 HC RX 258: Performed by: INTERNAL MEDICINE

## 2020-02-09 PROCEDURE — 84484 ASSAY OF TROPONIN QUANT: CPT

## 2020-02-09 PROCEDURE — 85045 AUTOMATED RETICULOCYTE COUNT: CPT

## 2020-02-09 PROCEDURE — G0328 FECAL BLOOD SCRN IMMUNOASSAY: HCPCS

## 2020-02-09 PROCEDURE — 87040 BLOOD CULTURE FOR BACTERIA: CPT

## 2020-02-09 RX ORDER — SODIUM CHLORIDE 0.9 % (FLUSH) 0.9 %
10 SYRINGE (ML) INJECTION PRN
Status: DISCONTINUED | OUTPATIENT
Start: 2020-02-09 | End: 2020-02-11 | Stop reason: HOSPADM

## 2020-02-09 RX ORDER — IPRATROPIUM BROMIDE AND ALBUTEROL SULFATE 2.5; .5 MG/3ML; MG/3ML
1 SOLUTION RESPIRATORY (INHALATION) EVERY 4 HOURS PRN
Status: DISCONTINUED | OUTPATIENT
Start: 2020-02-09 | End: 2020-02-11 | Stop reason: HOSPADM

## 2020-02-09 RX ORDER — POTASSIUM CHLORIDE 20 MEQ/1
40 TABLET, EXTENDED RELEASE ORAL ONCE
Status: COMPLETED | OUTPATIENT
Start: 2020-02-09 | End: 2020-02-09

## 2020-02-09 RX ORDER — ONDANSETRON 2 MG/ML
4 INJECTION INTRAMUSCULAR; INTRAVENOUS EVERY 6 HOURS PRN
Status: DISCONTINUED | OUTPATIENT
Start: 2020-02-09 | End: 2020-02-11 | Stop reason: HOSPADM

## 2020-02-09 RX ORDER — PANTOPRAZOLE SODIUM 40 MG/10ML
40 INJECTION, POWDER, LYOPHILIZED, FOR SOLUTION INTRAVENOUS DAILY
Status: DISCONTINUED | OUTPATIENT
Start: 2020-02-09 | End: 2020-02-11 | Stop reason: HOSPADM

## 2020-02-09 RX ORDER — SODIUM CHLORIDE 0.9 % (FLUSH) 0.9 %
10 SYRINGE (ML) INJECTION EVERY 12 HOURS SCHEDULED
Status: DISCONTINUED | OUTPATIENT
Start: 2020-02-09 | End: 2020-02-11 | Stop reason: HOSPADM

## 2020-02-09 RX ORDER — AMOXICILLIN 250 MG/1
250 CAPSULE ORAL 3 TIMES DAILY
Status: ON HOLD | COMMUNITY
Start: 2020-02-02 | End: 2020-02-13 | Stop reason: HOSPADM

## 2020-02-09 RX ORDER — FERROUS SULFATE 325(65) MG
325 TABLET ORAL
COMMUNITY

## 2020-02-09 RX ADMIN — Medication 10 ML: at 21:10

## 2020-02-09 RX ADMIN — PANTOPRAZOLE SODIUM 40 MG: 40 INJECTION, POWDER, FOR SOLUTION INTRAVENOUS at 16:30

## 2020-02-09 RX ADMIN — POTASSIUM CHLORIDE 40 MEQ: 1500 TABLET, EXTENDED RELEASE ORAL at 13:38

## 2020-02-09 RX ADMIN — IOPAMIDOL 75 ML: 755 INJECTION, SOLUTION INTRAVENOUS at 12:34

## 2020-02-09 ASSESSMENT — ENCOUNTER SYMPTOMS
NAUSEA: 0
SHORTNESS OF BREATH: 1
COUGH: 1
WHEEZING: 0
SORE THROAT: 0
DIARRHEA: 0
VOMITING: 0
BACK PAIN: 0
CHEST TIGHTNESS: 0
STRIDOR: 0
ABDOMINAL PAIN: 0

## 2020-02-09 ASSESSMENT — PAIN SCALES - GENERAL
PAINLEVEL_OUTOF10: 0

## 2020-02-09 ASSESSMENT — PAIN DESCRIPTION - PAIN TYPE: TYPE: ACUTE PAIN

## 2020-02-09 NOTE — PROGRESS NOTES
Pt up to brm with 1 person assist.  + BM. Sample obtained and sen to lab. Assisted pt back to bed. Pt denies needs at this time. All needs within reach. Call light in hand. Bed alarm set. Will continue to monitor.  Electronically signed by Abeba Grimes RN on 2/9/2020 at 6:26 PM

## 2020-02-09 NOTE — ED PROVIDER NOTES
[facility]        Pt Name: Leopold Harps  MRN: [id number]  [place] 1960  Date of evaluation: 2/9/2020  Provider: Meghna Amanda PA-C  PCP: Dax Swain MD    This patient was seen and evaluated by the attending physician Dr. Hermelinda Gutiérrez       Chief Complaint   Patient presents with    Fatigue     onset 3 weeks. followed with PCP and was diagnosed with anemia. Pt states fatigue is worsening. HISTORY OF PRESENT ILLNESS   (Location/Symptom, Timing/Onset, Context/Setting, Quality, Duration, Modifying Factors, Severity)  Note limiting factors. Leopold Harps is a 60 y.o. female presents the emergency department with reports of nearly a month of difficulties as it pertains to increasing levels of fatigue malaise and weakness. Patient states that she has been seen and evaluated by her primary care physician. She was diagnosed with anemia but the exact cause of this has not yet been able to be determined. Patient states she has had some associated symptoms of mild shortness of breath that seems to be worse with any kind of dyspnea on exertion. She states she is not experiencing difficulties as a pertains to substernal chest pain. She goes on to report she is intermittent for symptoms of cough but states that it really is at regular and usual baseline. She goes on to report that she has had some recent difficulties with some diarrhea this been nonbloody but she states she is on sure if it has been dark and tarry because she is been placed on iron for the documented anemia. She states that she has had 2 bouts of diarrhea in the overnight hours. She has had recent antibiotics in the form of amoxicillin. She states that she denies that she is experiencing fevers and or chills. She denies crampy abdominal pain. She denies flank pain. She is equivocal for diminished urinary output.   She denies dysuria, urgency, frequency. Patient states that she is on aware if she has had any significant weight loss or weight gain but does not believe that to be the case. In regards to her shortness of breath she does have a cancer history but denies that she is had unilateral leg pain or swelling. She denies a history of DVT and or PE in the past.  She obviously has increased risk factors related to the potential.  Patient states she is not having headache pain. She does not have any visual disturbances. She states she does not have significant pain and discomfort and with this she presents for evaluation and treatment. Nursing Notes were all reviewed and agreed with or any disagreements were addressed in the HPI. REVIEW OF SYSTEMS    (2-9 systems for level 4, 10 or more for level 5)     Review of Systems   Constitutional: Positive for fatigue. Negative for activity change, chills and fever. HENT: Negative for ear pain and sore throat. Respiratory: Positive for cough and shortness of breath. Negative for chest tightness, wheezing and stridor. Cardiovascular: Negative for chest pain. Gastrointestinal: Negative for abdominal pain, diarrhea, nausea and vomiting. Genitourinary: Negative for dysuria, flank pain and hematuria. Musculoskeletal: Negative for back pain and myalgias. Skin: Positive for pallor. Negative for rash and wound. Neurological: Positive for weakness. Negative for seizures, syncope, numbness and headaches. Positives and Pertinent negatives as per HPI. Except as noted above in the ROS, all other systems were reviewed and negative.        PAST MEDICAL HISTORY     Past Medical History:   Diagnosis Date    Arachnoiditis     Arthritis     Cancer ([facility] [address]     colon, PRIMARY BREAST         SURGICAL HISTORY     Past Surgical History:   Procedure Laterality Date    BACK SURGERY       SECTION      CHOLECYSTECTOMY      COLON SURGERY      MASTECTOMY Left 10/04/2017    LEFT BREAST TOTAL MASTECTOMY WITH SENTINEL LYMPH NODE BIOPSY         CURRENTMEDICATIONS       Previous Medications    AMOXICILLIN (AMOXIL) 250 MG CAPSULE    Take 250 mg by mouth 3 times daily         ALLERGIES     Patient has no known allergies. FAMILYHISTORY       Family History   Problem Relation Age of Onset    Arthritis Other     Cancer Other     Diabetes Other     Heart Disease Other     High Blood Pressure Other           SOCIAL HISTORY       Social History     Tobacco Use    Smoking status: Current Every Day Smoker     Packs/day: 1.00     Types: Cigarettes    Smokeless tobacco: Never Used    Tobacco comment: try to get. smokes 1 a day, not even a pack a day   Substance Use Topics    Alcohol use: Yes     Comment: OCCASIONAL GLASS OF WINE    Drug use: No       SCREENINGS             PHYSICAL EXAM    (up to 7 for level 4, 8 or more for level 5)     ED Triage Vitals [02/09/20 1107]   BP Temp Temp Source Pulse Resp SpO2 Height Weight   112/68 98.8 °F (37.1 °C) Infrared 104 16 100 % -- 130 lb (59 kg)       Physical Exam  Vitals signs and nursing note reviewed. Constitutional:       General: She is awake. She is not in acute distress. Appearance: Normal appearance. She is well-developed. She is ill-appearing (chronically). She is not diaphoretic. HENT:      Head: Normocephalic and atraumatic. No raccoon eyes or Boyle's sign. Right Ear: External ear normal.      Left Ear: External ear normal.      Nose: Nose normal.      Mouth/Throat:      Lips: Pink. Mouth: Mucous membranes are moist.      Pharynx: Oropharynx is clear. Eyes:      General: No scleral icterus. Right eye: No discharge. Left eye: No discharge. Conjunctiva/sclera: Conjunctivae normal.   Neck:      Musculoskeletal: Normal range of motion. Vascular: No JVD. Cardiovascular:      Rate and Rhythm: Normal rate and regular rhythm. Heart sounds: No murmur. No friction rub. No gallop.     Pulmonary: Effort: Pulmonary effort is normal. No accessory muscle usage or respiratory distress. Breath sounds: Normal breath sounds. No wheezing, rhonchi or rales. Comments: Diminished lung sounds on the right when compared to the left. Abdominal:      General: There is no distension. Palpations: Abdomen is soft. Abdomen is not rigid. There is no mass. Tenderness: There is no abdominal tenderness. There is no guarding or rebound. Genitourinary:     Rectum: Guaiac result negative. External hemorrhoid and internal hemorrhoid present. No mass, tenderness or anal fissure. Normal anal tone. Musculoskeletal:      Right lower leg: No edema. Left lower leg: No edema. Skin:     General: Skin is warm and dry. Neurological:      Mental Status: She is alert and oriented to person, place, and time. GCS: GCS eye subscore is 4. GCS verbal subscore is 5. GCS motor subscore is 6. Cranial Nerves: No cranial nerve deficit. Sensory: No sensory deficit. Coordination: Coordination normal.   Psychiatric:         Behavior: Behavior normal. Behavior is cooperative.          DIAGNOSTIC RESULTS   LABS:    Labs Reviewed   CBC WITH AUTO DIFFERENTIAL - Abnormal; Notable for the following components:       Result Value    Hemoglobin 10.6 (*)     Hematocrit 34.4 (*)     MCV 79.5 (*)     MCH 24.3 (*)     MCHC 30.6 (*)     RDW 15.6 (*)     Platelets 781 (*)     All other components within normal limits    Narrative:     Performed at:  [facility] [address], [place], [facility] [address]   Phone [phone]   COMPREHENSIVE METABOLIC PANEL W/ REFLEX TO MG FOR LOW K - Abnormal; Notable for the following components:    Sodium 133 (*)     Potassium reflex Magnesium 3.4 (*)     CO2 20 (*)     Glucose 185 (*)     CREATININE <0.5 (*)     Alb 3.2 (*)     Albumin/Globulin Ratio 0.8 (*)     ALT 7 (*)     AST 13 (*)     All other components within normal limits    Narrative: Performed at:  [facility] [address], [address]   Phone [phone]   BRAIN NATRIURETIC PEPTIDE - Abnormal; Notable for the following components:    Pro- (*)     All other components within normal limits    Narrative:     Performed at:  [facility] [address], [address]   Phone [phone]   PROTIME-INR - Abnormal; Notable for the following components:    Protime 14.2 (*)     INR 1.22 (*)     All other components within normal limits    Narrative:     Performed at:  [facility] [address], [address]   Phone [phone]   CULTURE BLOOD #1   CULTURE BLOOD #2   C DIFF TOXIN/ANTIGEN   TROPONIN    Narrative:     Performed at:  [facility] [address], [place]   Phone [phone]   LACTATE, SEPSIS    Narrative:     Performed at:  [facility] [address], [place]   Phone [phone]   BLOOD OCCULT STOOL DIAGNOSTIC    Narrative:     ORDER#: [id number]                          ORDERED BY: Justine Briscoe  SOURCE: Stool                              COLLECTED:  02/09/20 11:32  ANTIBIOTICS AT JAMEL.:                      RECEIVED :  02/09/20 11:41  Performed at:  [facility] [address], [place]   Phone [phone]   MAGNESIUM    Narrative:     Performed at:  [facility] [address], [place]   Phone [phone]   URINE RT REFLEX TO CULTURE   LACTATE, SEPSIS       All other labs were within normal range or not returned as of this dictation. EKG: All EKG's are interpreted by the Emergency Department Physician in the absence of a cardiologist.  Please see their note for interpretation of EKG.       RADIOLOGY:   Non-plain film images such as CT, Ultrasound and MRI are read by the radiologist. Mallory Jeffers radiographic images are visualized and preliminarily interpreted by the ED Provider with the below findings:        Interpretation per the Radiologist below, if available at the time of this note:    CT ABDOMEN PELVIS W IV CONTRAST Additional Contrast? None   Final Result   1. Negative for pulmonary embolus. 2. Infiltrating 13 cm left upper lobe bronchogenic carcinoma with extensive   pulmonary metastatic disease. 3. Negative for abdominopelvic metastatic disease. CT CHEST PULMONARY EMBOLISM W CONTRAST   Final Result   1. Negative for pulmonary embolus. 2. Infiltrating 13 cm left upper lobe bronchogenic carcinoma with extensive   pulmonary metastatic disease. 3. Negative for abdominopelvic metastatic disease. XR CHEST PORTABLE   Final Result   Large consolidated opacity left apex. This is indeterminate and may   represent pneumonia, however underlying mass lesion is not excluded. CT   chest with contrast recommended      Numerous lung nodules suggesting diffuse metastatic disease. PROCEDURES   Unless otherwise noted below, none     Procedures    CRITICAL CARE TIME   N/A    CONSULTS:  IP CONSULT TO HOSPITALIST      EMERGENCY DEPARTMENT COURSE and DIFFERENTIAL DIAGNOSIS/MDM:   Vitals:    Vitals:    02/09/20 1230 02/09/20 1251 02/09/20 1252 02/09/20 1253   BP: (!) 97/55 96/60     Pulse: 78  72 76   Resp: 18  17 17   Temp:       TempSrc:       SpO2: 93%  94% 95%   Weight:           Patient was given the following medications:  Medications   iopamidol (ISOVUE-370) 76 % injection 75 mL (75 mLs Intravenous Given 2/9/20 1234)   potassium chloride (KLOR-CON M) extended release tablet 40 mEq (40 mEq Oral Given 2/9/20 1338)       The patient's detailed history of present illness is documented as above. Upon arrival to the emergency department the patient's vital signs are as documented. The patient is noted to be hemodynamically stable and afebrile. Physical examination findings are as above. The access was obtained. Laboratory testing and work-up was initiated. EKG demonstrates a sinus rhythm with a rate of 88. No evidence of acute ST elevation. Please see attending physician details for further EKG interpretation in comparison. CBC demonstrates no evidence of leukocytosis. She has anemia with a hemoglobin of 10.6 hematocrit of 34.4. There is evidence of thrombocytosis with a platelet count of 069. BUN is 14 creatinine is less than . 5. Nonfasting glucose is elevated at 185. Potassium is mildly low at 3.4 this will be replaced as above. LFTs are unremarkable. Sean less than 0.01.  proBNP 221. INR is 122. Lactic acid is 1.6. Magnesium is 2.0. His Hemoccult is negative. Initial portable chest x-ray started a large consolidative [place] in the left apex. Concerns for numerous lung nodules suggesting diffuse metastatic disease. CT PE study had already been initiated because of the patient's shortness of breath. CT PE study demonstrates no evidence of pulmonary embolism. There is an infiltrating 13 cm left upper lobe bronchogenic carcinoma with extensive pulmonary metastatic disease. The abdomen and pelvis demonstrates no evidence of metastatic disease. The above mention was discussed with the patient in detail. Ongoing care management on an inpatient basis in regards to this is been recommended. Patient is amenable to it. Patient will be admitted to the medical surgical services for ongoing care management the diagnoses below. Case was discussed directly with Dr. Mayra Bob.  Patient will be admitted to the medical surgical services for ongoing care management the diagnoses below. FINAL IMPRESSION      1. Bronchogenic carcinoma of left lung ([address]    2. Malignant neoplasm metastatic to lung, unspecified laterality ([address]    3. Generalized weakness    4. Malaise and fatigue    5.  Anemia, unspecified type          DISPOSITION/PLAN DISPOSITION: Admit medical stable condition      DISCONTINUED MEDICATIONS:  Discontinued Medications    HYDROCODONE-ACETAMINOPHEN (NORCO) 5-325 MG PER TABLET    Take 1-2 tabs every 4-6 hours as needed for pain. .    IBUPROFEN (ADVIL;MOTRIN) 800 MG TABLET    Take 800 mg by mouth as needed for Pain          (Please note that portions of this note were completed with a voice recognition program.  Efforts were made to edit the dictations but occasionally words are mis-transcribed.)    Ulysses Rous, PA-C (electronically signed)           Sary Turner PA-C  02/09/20 7259

## 2020-02-09 NOTE — H&P
Hospital Medicine History & Physical      PCP: Rasheed Sun MD    Date of Admission: 2020    Date of Service: Pt seen/examined on  and Admitted to Inpatient with expected LOS greater than two midnights due to medical therapy. Chief Complaint:  Fatigue      History Of Present Illness:     60 y.o. female with PMH of breast ca, OA who presents with generalized weakness and fatigue. Has been going for 3-4 weeks. Seen per PCP and found to be anemic and was started on iron replacement. Associated with intermittent episodes of dyspnea. Symptoms worsened with exertion. Also with one episode of dark diarrhea. Denies chest pain, fever, chills, abdominal pain, gross bleeding, calf pain or erythema. CT imaging in ED found remarkable for large mass with evidence of mets. Past Medical History:          Diagnosis Date    Arachnoiditis     Arthritis     Cancer ([address]     colon, PRIMARY BREAST       Past Surgical History:          Procedure Laterality Date    BACK SURGERY       SECTION      CHOLECYSTECTOMY      COLON SURGERY      MASTECTOMY Left 10/04/2017    LEFT BREAST TOTAL MASTECTOMY WITH SENTINEL LYMPH NODE BIOPSY       Medications Prior to Admission:      Prior to Admission medications    Medication Sig Start Date End Date Taking? Authorizing Provider   amoxicillin (AMOXIL) 250 MG capsule Take 250 mg by mouth 3 times daily 20  Yes Historical Provider, MD       Allergies:  Patient has no known allergies. Social History:      TOBACCO:   reports that she has been smoking cigarettes. She has been smoking about 1.00 pack per day. She has never used smokeless tobacco.  ETOH:   reports current alcohol use. Family History:          Problem Relation Age of Onset    Arthritis Other     Cancer Other     Diabetes Other     Heart Disease Other     High Blood Pressure Other        REVIEW OF SYSTEMS:   Pertinent positives as noted in the HPI.  All other systems reviewed and negative. PHYSICAL EXAM:    BP 96/60   Pulse 76   Temp 98.8 °F (37.1 °C) (Infrared)   Resp 17   Wt 130 lb (59 kg)   LMP 06/01/2011   SpO2 95%   BMI 20.98 kg/m²     Gen/overall appearance: Not in acute distress. Alert. Head: Normocephalic, atraumatic  Eyes: EOMI, no scleral icterus  CVS: regular rate and rhythm, Normal S1S2  Pulm: Diminished, Clear to auscultation bilaterally. No crackles/wheezes  Gastrointestinal: Soft, nontender, nondistended, no guarding or rebound  Extremities: No edema. No erythema or warmth  Neuro: No gross focal deficits noted  Skin: Warm, dry    Labs:     Recent Labs     02/09/20  1132   WBC 8.4   HGB 10.6*   HCT 34.4*   *     Recent Labs     02/09/20  1132   *   K 3.4*   CL 99   CO2 20*   BUN 14   CREATININE <0.5*   CALCIUM 8.8     Recent Labs     02/09/20  1132   AST 13*   ALT 7*   BILITOT <0.2   ALKPHOS 106     Recent Labs     02/09/20  1132   INR 1.22*     Recent Labs     02/09/20  1132   TROPONINI <0.01       Urinalysis:      Lab Results   Component Value Date    NITRU NEGATIVE 06/09/2011    BLOODU NEGATIVE 06/09/2011    SPECGRAV <=1.005 06/09/2011    GLUCOSEU NEGATIVE 06/09/2011         ASSESSMENT:    There are no active hospital problems to display for this patient. 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 - oncology and pulm consultation  - will likely need biopsy and staging  - O2 per protocol  - respiratory care    History of breast cancer s/p mastectomy. No chemo / radiation. Acute vs chronic anemia. Last Hgb on file from 2011 which was normal.  No gross foci of bleeding  Dark diarrhea episode x2.   Suspect 2/2 iron replacement  - recently started on iron replacement therapy as outpt  - recheck iron panel, retic  - c diff pending from ED  - FOBT neg in ED  - trend H&H  - empiric protonix 40 qday for now    Generalized weaknesss 2/2 above    DVT Prophylaxis: scds  Diet: No diet orders on file  Code Status: Prior    Tianna Arboleda MD    Thank you Clayton Andrews MD for the opportunity to be involved in this patient's care.

## 2020-02-09 NOTE — PROGRESS NOTES
4 Eyes Skin Assessment     The patient is being assess for  Admission    I agree that 2 RN's have performed a thorough Head to Toe Skin Assessment on the patient. ALL assessment sites listed below have been assessed. Areas assessed by both nurses: NU Davis and Stephanie Kim RN  [x]   Head, Face, and Ears   [x]   Shoulders, Back, and Chest  [x]   Arms, Elbows, and Hands   [x]   Coccyx, Sacrum, and IschIum  [x]   Legs, Feet, and Heels        Does the Patient have Skin Breakdown?   No         Tha Prevention initiated:  No   Wound Care Orders initiated:  No      C nurse consulted for Pressure Injury (Stage 3,4, Unstageable, DTI, NWPT, and Complex wounds), New and Established Ostomies:  No      Nurse 1 eSignature: Electronically signed by Sd Santiago RN on 2/9/20 at 6:27 PM    **SHARE this note so that the co-signing nurse is able to place an eSignature**    Nurse 2 eSignature: {Esignature:[id number]}

## 2020-02-09 NOTE — ED PROVIDER NOTES
I independently performed a history and physical on [address] All diagnostic, treatment, and disposition decisions were made by myself in conjunction with the advanced practice provider. Briefly, this is a 60 y.o. female here for a few weeks of fatigue, malaise, weakness. Hx of BRCA s/p excision 2017. On exam, Ill appearing. Hoarse voice. Palpable supraclavicular BRITANY. Right upper lobe sounds absent. Normal elsewhere. Reflex tachycardia and Split S3 with deep inspiration. EKG  EKG reviewed by myself  Dated today, 1135  Rate 88, NSR, NSTW Changes. No prior. Screenings            MDM  Probably a large RUL lung mass. Reviewed workup; 13cm infiltrating bronchogenic carcinoma. Admit. Hx of Colon CA and BRCA. Patient Referrals:  Olivia Perea MD  [place] [address]  [address] [place] [address] [address]  [phone]            Discharge Medications:  New Prescriptions    No medications on file       FINAL IMPRESSION  1. Bronchogenic carcinoma of left lung ([facility]    2. Malignant neoplasm metastatic to lung, unspecified laterality ([address]    3. Generalized weakness    4. Malaise and fatigue    5. Anemia, unspecified type        Blood pressure 96/60, pulse 76, temperature 98.8 °F (37.1 °C), temperature source Infrared, resp. rate 17, weight 130 lb (59 kg), last menstrual period 06/01/2011, SpO2 95 %, not currently breastfeeding. For further details of [facility] emergency department encounter, please see documentation by advanced practice provider, Aundrea Duffy.         Carrie Valencia MD  02/09/20 [id number]

## 2020-02-09 NOTE — ED NOTES
Patient report given to floor nurse at bedside, patient transported to floor in stable condition.         Quoc Rasheed RN  02/09/20 [id number]

## 2020-02-09 NOTE — PROGRESS NOTES
Pt arrival to 5T. Head to toe assessment complete. VSS. Admission assessment complete. Pt and family oriented to unit, room, and call light. POC discussed. All parties verbalized understanding. Bed alarm set. Call light in hand. Will continue to monitor.

## 2020-02-10 ENCOUNTER — ANESTHESIA EVENT (OUTPATIENT)
Dept: ENDOSCOPY | Age: 60
DRG: 181 | End: 2020-02-10
Payer: COMMERCIAL

## 2020-02-10 ENCOUNTER — APPOINTMENT (OUTPATIENT)
Dept: MRI IMAGING | Age: 60
DRG: 181 | End: 2020-02-10
Payer: COMMERCIAL

## 2020-02-10 ENCOUNTER — ANESTHESIA (OUTPATIENT)
Dept: ENDOSCOPY | Age: 60
DRG: 181 | End: 2020-02-10
Payer: COMMERCIAL

## 2020-02-10 VITALS
DIASTOLIC BLOOD PRESSURE: 69 MMHG | SYSTOLIC BLOOD PRESSURE: 115 MMHG | OXYGEN SATURATION: 95 % | TEMPERATURE: 98.9 F | BODY MASS INDEX: 19.13 KG/M2 | HEART RATE: 77 BPM | WEIGHT: 119 LBS | RESPIRATION RATE: 18 BRPM | HEIGHT: 66 IN

## 2020-02-10 VITALS
OXYGEN SATURATION: 94 % | SYSTOLIC BLOOD PRESSURE: 121 MMHG | DIASTOLIC BLOOD PRESSURE: 71 MMHG | RESPIRATION RATE: 4 BRPM

## 2020-02-10 PROBLEM — J98.4 CAVITATING MASS IN LEFT UPPER LUNG LOBE: Status: ACTIVE | Noted: 2020-02-10

## 2020-02-10 PROBLEM — R49.0 HOARSENESS OF VOICE: Status: ACTIVE | Noted: 2020-02-10

## 2020-02-10 PROBLEM — C78.00 PULMONARY METASTASES (HCC): Status: ACTIVE | Noted: 2020-02-10

## 2020-02-10 PROBLEM — C79.31 BRAIN METASTASES (HCC): Status: ACTIVE | Noted: 2020-02-10

## 2020-02-10 PROBLEM — J90 PLEURAL EFFUSION ON LEFT: Status: ACTIVE | Noted: 2020-02-10

## 2020-02-10 PROBLEM — Z87.891 FORMER SMOKER: Status: ACTIVE | Noted: 2020-02-10

## 2020-02-10 PROBLEM — E44.1 MILD MALNUTRITION (HCC): Chronic | Status: ACTIVE | Noted: 2020-02-10

## 2020-02-10 LAB
ANION GAP SERPL CALCULATED.3IONS-SCNC: 13 MMOL/L (ref 3–16)
BILIRUBIN URINE: NEGATIVE
BLOOD, URINE: NEGATIVE
BUN BLDV-MCNC: 10 MG/DL (ref 7–20)
CALCIUM SERPL-MCNC: 8.4 MG/DL (ref 8.3–10.6)
CHLORIDE BLD-SCNC: 100 MMOL/L (ref 99–110)
CLARITY: ABNORMAL
CO2: 21 MMOL/L (ref 21–32)
COLOR: YELLOW
CREAT SERPL-MCNC: <0.5 MG/DL (ref 0.6–1.2)
EKG ATRIAL RATE: 88 BPM
EKG DIAGNOSIS: NORMAL
EKG P AXIS: 82 DEGREES
EKG P-R INTERVAL: 140 MS
EKG Q-T INTERVAL: 360 MS
EKG QRS DURATION: 88 MS
EKG QTC CALCULATION (BAZETT): 435 MS
EKG R AXIS: 82 DEGREES
EKG T AXIS: 63 DEGREES
EKG VENTRICULAR RATE: 88 BPM
EPITHELIAL CELLS, UA: 5 /HPF (ref 0–5)
FERRITIN: 456 NG/ML (ref 15–150)
GFR AFRICAN AMERICAN: >60
GFR NON-AFRICAN AMERICAN: >60
GLUCOSE BLD-MCNC: 119 MG/DL (ref 70–99)
GLUCOSE URINE: NEGATIVE MG/DL
HCT VFR BLD CALC: 30 % (ref 36–48)
HCT VFR BLD CALC: 30.8 % (ref 36–48)
HCT VFR BLD CALC: 31.8 % (ref 36–48)
HCT VFR BLD CALC: 32 % (ref 36–48)
HEMOGLOBIN: 10.1 G/DL (ref 12–16)
HEMOGLOBIN: 10.2 G/DL (ref 12–16)
HEMOGLOBIN: 9.7 G/DL (ref 12–16)
HEMOGLOBIN: 9.9 G/DL (ref 12–16)
HYALINE CASTS: 6 /LPF (ref 0–8)
IRON SATURATION: 17 % (ref 15–50)
IRON: 32 UG/DL (ref 37–145)
KETONES, URINE: NEGATIVE MG/DL
LEUKOCYTE ESTERASE, URINE: ABNORMAL
MCH RBC QN AUTO: 24.6 PG (ref 26–34)
MCHC RBC AUTO-ENTMCNC: 32.2 G/DL (ref 31–36)
MCV RBC AUTO: 76.5 FL (ref 80–100)
MICROSCOPIC EXAMINATION: YES
NITRITE, URINE: NEGATIVE
PDW BLD-RTO: 15.3 % (ref 12.4–15.4)
PH UA: 6.5 (ref 5–8)
PLATELET # BLD: 517 K/UL (ref 135–450)
PMV BLD AUTO: 6.4 FL (ref 5–10.5)
POTASSIUM REFLEX MAGNESIUM: 4.4 MMOL/L (ref 3.5–5.1)
PROTEIN UA: NEGATIVE MG/DL
RBC # BLD: 3.92 M/UL (ref 4–5.2)
RBC UA: 5 /HPF (ref 0–4)
SODIUM BLD-SCNC: 134 MMOL/L (ref 136–145)
SPECIFIC GRAVITY UA: >1.03 (ref 1–1.03)
TOTAL IRON BINDING CAPACITY: 188 UG/DL (ref 260–445)
URINE REFLEX TO CULTURE: YES
URINE TYPE: ABNORMAL
UROBILINOGEN, URINE: 1 E.U./DL
WBC # BLD: 6.7 K/UL (ref 4–11)
WBC UA: 9 /HPF (ref 0–5)

## 2020-02-10 PROCEDURE — 6370000000 HC RX 637 (ALT 250 FOR IP): Performed by: PHYSICIAN ASSISTANT

## 2020-02-10 PROCEDURE — 0B9G8ZX DRAINAGE OF LEFT UPPER LUNG LOBE, VIA NATURAL OR ARTIFICIAL OPENING ENDOSCOPIC, DIAGNOSTIC: ICD-10-PCS | Performed by: INTERNAL MEDICINE

## 2020-02-10 PROCEDURE — 88112 CYTOPATH CELL ENHANCE TECH: CPT

## 2020-02-10 PROCEDURE — 0BD88ZX EXTRACTION OF LEFT UPPER LOBE BRONCHUS, VIA NATURAL OR ARTIFICIAL OPENING ENDOSCOPIC, DIAGNOSTIC: ICD-10-PCS | Performed by: INTERNAL MEDICINE

## 2020-02-10 PROCEDURE — 31623 DX BRONCHOSCOPE/BRUSH: CPT | Performed by: INTERNAL MEDICINE

## 2020-02-10 PROCEDURE — 93010 ELECTROCARDIOGRAM REPORT: CPT | Performed by: INTERNAL MEDICINE

## 2020-02-10 PROCEDURE — 2580000003 HC RX 258: Performed by: NURSE ANESTHETIST, CERTIFIED REGISTERED

## 2020-02-10 PROCEDURE — 80048 BASIC METABOLIC PNL TOTAL CA: CPT

## 2020-02-10 PROCEDURE — 87116 MYCOBACTERIA CULTURE: CPT

## 2020-02-10 PROCEDURE — 70553 MRI BRAIN STEM W/O & W/DYE: CPT

## 2020-02-10 PROCEDURE — 2720000010 HC SURG SUPPLY STERILE: Performed by: INTERNAL MEDICINE

## 2020-02-10 PROCEDURE — 0BDG8ZX EXTRACTION OF LEFT UPPER LUNG LOBE, VIA NATURAL OR ARTIFICIAL OPENING ENDOSCOPIC, DIAGNOSTIC: ICD-10-PCS | Performed by: INTERNAL MEDICINE

## 2020-02-10 PROCEDURE — 87102 FUNGUS ISOLATION CULTURE: CPT

## 2020-02-10 PROCEDURE — 1200000000 HC SEMI PRIVATE

## 2020-02-10 PROCEDURE — 31629 BRONCHOSCOPY/NEEDLE BX EACH: CPT | Performed by: INTERNAL MEDICINE

## 2020-02-10 PROCEDURE — 6360000002 HC RX W HCPCS: Performed by: NURSE ANESTHETIST, CERTIFIED REGISTERED

## 2020-02-10 PROCEDURE — 6360000004 HC RX CONTRAST MEDICATION: Performed by: INTERNAL MEDICINE

## 2020-02-10 PROCEDURE — 85018 HEMOGLOBIN: CPT

## 2020-02-10 PROCEDURE — A9577 INJ MULTIHANCE: HCPCS | Performed by: INTERNAL MEDICINE

## 2020-02-10 PROCEDURE — C1725 CATH, TRANSLUMIN NON-LASER: HCPCS | Performed by: INTERNAL MEDICINE

## 2020-02-10 PROCEDURE — 85027 COMPLETE CBC AUTOMATED: CPT

## 2020-02-10 PROCEDURE — 07D78ZX EXTRACTION OF THORAX LYMPHATIC, VIA NATURAL OR ARTIFICIAL OPENING ENDOSCOPIC, DIAGNOSTIC: ICD-10-PCS | Performed by: INTERNAL MEDICINE

## 2020-02-10 PROCEDURE — 81001 URINALYSIS AUTO W/SCOPE: CPT

## 2020-02-10 PROCEDURE — 7100000000 HC PACU RECOVERY - FIRST 15 MIN: Performed by: INTERNAL MEDICINE

## 2020-02-10 PROCEDURE — 3609020000 HC BRONCHOSCOPY W/EBUS FNA: Performed by: INTERNAL MEDICINE

## 2020-02-10 PROCEDURE — 6370000000 HC RX 637 (ALT 250 FOR IP): Performed by: INTERNAL MEDICINE

## 2020-02-10 PROCEDURE — 3603165200 HC BRNCHSC EBUS GUIDED SAMPL 1/2 NODE STATION/STRUX: Performed by: INTERNAL MEDICINE

## 2020-02-10 PROCEDURE — 2580000003 HC RX 258: Performed by: INTERNAL MEDICINE

## 2020-02-10 PROCEDURE — 6360000002 HC RX W HCPCS: Performed by: PHYSICIAN ASSISTANT

## 2020-02-10 PROCEDURE — C9113 INJ PANTOPRAZOLE SODIUM, VIA: HCPCS | Performed by: INTERNAL MEDICINE

## 2020-02-10 PROCEDURE — 87206 SMEAR FLUORESCENT/ACID STAI: CPT

## 2020-02-10 PROCEDURE — 3609010800 HC BRONCHOSCOPY ALVEOLAR LAVAGE: Performed by: INTERNAL MEDICINE

## 2020-02-10 PROCEDURE — 85014 HEMATOCRIT: CPT

## 2020-02-10 PROCEDURE — G0008 ADMIN INFLUENZA VIRUS VAC: HCPCS | Performed by: INTERNAL MEDICINE

## 2020-02-10 PROCEDURE — 2709999900 HC NON-CHARGEABLE SUPPLY: Performed by: INTERNAL MEDICINE

## 2020-02-10 PROCEDURE — 87070 CULTURE OTHR SPECIMN AEROBIC: CPT

## 2020-02-10 PROCEDURE — 3700000000 HC ANESTHESIA ATTENDED CARE: Performed by: INTERNAL MEDICINE

## 2020-02-10 PROCEDURE — 88312 SPECIAL STAINS GROUP 1: CPT

## 2020-02-10 PROCEDURE — 6360000002 HC RX W HCPCS: Performed by: INTERNAL MEDICINE

## 2020-02-10 PROCEDURE — 87015 SPECIMEN INFECT AGNT CONCNTJ: CPT

## 2020-02-10 PROCEDURE — 31624 DX BRONCHOSCOPE/LAVAGE: CPT | Performed by: INTERNAL MEDICINE

## 2020-02-10 PROCEDURE — 90686 IIV4 VACC NO PRSV 0.5 ML IM: CPT | Performed by: INTERNAL MEDICINE

## 2020-02-10 PROCEDURE — 99254 IP/OBS CNSLTJ NEW/EST MOD 60: CPT | Performed by: INTERNAL MEDICINE

## 2020-02-10 PROCEDURE — 3609011100 HC BRONCHOSCOPY BRUSHINGS: Performed by: INTERNAL MEDICINE

## 2020-02-10 PROCEDURE — 31652 BRONCH EBUS SAMPLNG 1/2 NODE: CPT | Performed by: INTERNAL MEDICINE

## 2020-02-10 PROCEDURE — 3700000001 HC ADD 15 MINUTES (ANESTHESIA): Performed by: INTERNAL MEDICINE

## 2020-02-10 PROCEDURE — 87205 SMEAR GRAM STAIN: CPT

## 2020-02-10 PROCEDURE — 88173 CYTOPATH EVAL FNA REPORT: CPT

## 2020-02-10 PROCEDURE — 36415 COLL VENOUS BLD VENIPUNCTURE: CPT

## 2020-02-10 PROCEDURE — 88305 TISSUE EXAM BY PATHOLOGIST: CPT

## 2020-02-10 PROCEDURE — 7100000001 HC PACU RECOVERY - ADDTL 15 MIN: Performed by: INTERNAL MEDICINE

## 2020-02-10 PROCEDURE — 2500000003 HC RX 250 WO HCPCS: Performed by: NURSE ANESTHETIST, CERTIFIED REGISTERED

## 2020-02-10 PROCEDURE — 87086 URINE CULTURE/COLONY COUNT: CPT

## 2020-02-10 RX ORDER — HYDROMORPHONE HCL 110MG/55ML
0.25 PATIENT CONTROLLED ANALGESIA SYRINGE INTRAVENOUS EVERY 5 MIN PRN
Status: DISCONTINUED | OUTPATIENT
Start: 2020-02-10 | End: 2020-02-10 | Stop reason: HOSPADM

## 2020-02-10 RX ORDER — FENTANYL CITRATE 50 UG/ML
50 INJECTION, SOLUTION INTRAMUSCULAR; INTRAVENOUS EVERY 5 MIN PRN
Status: DISCONTINUED | OUTPATIENT
Start: 2020-02-10 | End: 2020-02-10 | Stop reason: HOSPADM

## 2020-02-10 RX ORDER — OXYCODONE HYDROCHLORIDE 5 MG/1
5 TABLET ORAL PRN
Status: DISCONTINUED | OUTPATIENT
Start: 2020-02-10 | End: 2020-02-10 | Stop reason: HOSPADM

## 2020-02-10 RX ORDER — EPHEDRINE SULFATE/0.9% NACL/PF 50 MG/5 ML
SYRINGE (ML) INTRAVENOUS PRN
Status: DISCONTINUED | OUTPATIENT
Start: 2020-02-10 | End: 2020-02-10 | Stop reason: SDUPTHER

## 2020-02-10 RX ORDER — SODIUM CHLORIDE 0.9 % (FLUSH) 0.9 %
10 SYRINGE (ML) INJECTION PRN
Status: CANCELLED | OUTPATIENT
Start: 2020-02-10

## 2020-02-10 RX ORDER — ACETAMINOPHEN 325 MG/1
650 TABLET ORAL EVERY 4 HOURS PRN
Status: DISCONTINUED | OUTPATIENT
Start: 2020-02-10 | End: 2020-02-11 | Stop reason: HOSPADM

## 2020-02-10 RX ORDER — DEXAMETHASONE SODIUM PHOSPHATE 4 MG/ML
INJECTION, SOLUTION INTRA-ARTICULAR; INTRALESIONAL; INTRAMUSCULAR; INTRAVENOUS; SOFT TISSUE PRN
Status: DISCONTINUED | OUTPATIENT
Start: 2020-02-10 | End: 2020-02-10 | Stop reason: SDUPTHER

## 2020-02-10 RX ORDER — MEPERIDINE HYDROCHLORIDE 25 MG/ML
12.5 INJECTION INTRAMUSCULAR; INTRAVENOUS; SUBCUTANEOUS EVERY 5 MIN PRN
Status: DISCONTINUED | OUTPATIENT
Start: 2020-02-10 | End: 2020-02-10 | Stop reason: HOSPADM

## 2020-02-10 RX ORDER — ONDANSETRON 2 MG/ML
INJECTION INTRAMUSCULAR; INTRAVENOUS PRN
Status: DISCONTINUED | OUTPATIENT
Start: 2020-02-10 | End: 2020-02-10 | Stop reason: SDUPTHER

## 2020-02-10 RX ORDER — PROPOFOL 10 MG/ML
INJECTION, EMULSION INTRAVENOUS PRN
Status: DISCONTINUED | OUTPATIENT
Start: 2020-02-10 | End: 2020-02-10 | Stop reason: SDUPTHER

## 2020-02-10 RX ORDER — LABETALOL HYDROCHLORIDE 5 MG/ML
5 INJECTION, SOLUTION INTRAVENOUS EVERY 10 MIN PRN
Status: DISCONTINUED | OUTPATIENT
Start: 2020-02-10 | End: 2020-02-10 | Stop reason: HOSPADM

## 2020-02-10 RX ORDER — LIDOCAINE HYDROCHLORIDE 20 MG/ML
INJECTION, SOLUTION EPIDURAL; INFILTRATION; INTRACAUDAL; PERINEURAL PRN
Status: DISCONTINUED | OUTPATIENT
Start: 2020-02-10 | End: 2020-02-10 | Stop reason: SDUPTHER

## 2020-02-10 RX ORDER — SODIUM CHLORIDE 9 MG/ML
INJECTION, SOLUTION INTRAVENOUS CONTINUOUS PRN
Status: DISCONTINUED | OUTPATIENT
Start: 2020-02-10 | End: 2020-02-10 | Stop reason: SDUPTHER

## 2020-02-10 RX ORDER — OXYCODONE HYDROCHLORIDE 5 MG/1
10 TABLET ORAL PRN
Status: DISCONTINUED | OUTPATIENT
Start: 2020-02-10 | End: 2020-02-10 | Stop reason: HOSPADM

## 2020-02-10 RX ORDER — HYDROMORPHONE HCL 110MG/55ML
0.5 PATIENT CONTROLLED ANALGESIA SYRINGE INTRAVENOUS EVERY 5 MIN PRN
Status: DISCONTINUED | OUTPATIENT
Start: 2020-02-10 | End: 2020-02-10 | Stop reason: HOSPADM

## 2020-02-10 RX ORDER — LIDOCAINE HYDROCHLORIDE 40 MG/ML
SOLUTION TOPICAL PRN
Status: DISCONTINUED | OUTPATIENT
Start: 2020-02-10 | End: 2020-02-10 | Stop reason: ALTCHOICE

## 2020-02-10 RX ORDER — PHENYLEPHRINE HYDROCHLORIDE 10 MG/ML
INJECTION INTRAVENOUS PRN
Status: DISCONTINUED | OUTPATIENT
Start: 2020-02-10 | End: 2020-02-10 | Stop reason: SDUPTHER

## 2020-02-10 RX ORDER — DIPHENHYDRAMINE HYDROCHLORIDE 50 MG/ML
12.5 INJECTION INTRAMUSCULAR; INTRAVENOUS
Status: DISCONTINUED | OUTPATIENT
Start: 2020-02-10 | End: 2020-02-10 | Stop reason: HOSPADM

## 2020-02-10 RX ORDER — DEXAMETHASONE SODIUM PHOSPHATE 4 MG/ML
10 INJECTION, SOLUTION INTRA-ARTICULAR; INTRALESIONAL; INTRAMUSCULAR; INTRAVENOUS; SOFT TISSUE ONCE
Status: COMPLETED | OUTPATIENT
Start: 2020-02-10 | End: 2020-02-10

## 2020-02-10 RX ORDER — DEXAMETHASONE 4 MG/1
4 TABLET ORAL EVERY 6 HOURS SCHEDULED
Status: DISCONTINUED | OUTPATIENT
Start: 2020-02-10 | End: 2020-02-11 | Stop reason: HOSPADM

## 2020-02-10 RX ORDER — SODIUM CHLORIDE 0.9 % (FLUSH) 0.9 %
10 SYRINGE (ML) INJECTION EVERY 12 HOURS SCHEDULED
Status: CANCELLED | OUTPATIENT
Start: 2020-02-10

## 2020-02-10 RX ORDER — SODIUM CHLORIDE 9 MG/ML
INJECTION, SOLUTION INTRAVENOUS CONTINUOUS
Status: CANCELLED | OUTPATIENT
Start: 2020-02-10

## 2020-02-10 RX ORDER — LEVETIRACETAM 500 MG/1
500 TABLET ORAL 2 TIMES DAILY
Status: DISCONTINUED | OUTPATIENT
Start: 2020-02-10 | End: 2020-02-11 | Stop reason: HOSPADM

## 2020-02-10 RX ORDER — PROMETHAZINE HYDROCHLORIDE 25 MG/ML
6.25 INJECTION, SOLUTION INTRAMUSCULAR; INTRAVENOUS PRN
Status: DISCONTINUED | OUTPATIENT
Start: 2020-02-10 | End: 2020-02-10 | Stop reason: HOSPADM

## 2020-02-10 RX ADMIN — GADOBENATE DIMEGLUMINE 10 ML: 529 INJECTION, SOLUTION INTRAVENOUS at 20:10

## 2020-02-10 RX ADMIN — LEVETIRACETAM 500 MG: 500 TABLET ORAL at 21:42

## 2020-02-10 RX ADMIN — ONDANSETRON 4 MG: 2 INJECTION INTRAMUSCULAR; INTRAVENOUS at 11:25

## 2020-02-10 RX ADMIN — Medication 10 MG: at 11:25

## 2020-02-10 RX ADMIN — Medication 10 ML: at 20:10

## 2020-02-10 RX ADMIN — Medication 10 ML: at 21:00

## 2020-02-10 RX ADMIN — SODIUM CHLORIDE: 9 INJECTION, SOLUTION INTRAVENOUS at 12:11

## 2020-02-10 RX ADMIN — ACETAMINOPHEN 650 MG: 325 TABLET, FILM COATED ORAL at 02:56

## 2020-02-10 RX ADMIN — PHENYLEPHRINE HYDROCHLORIDE 50 MCG: 10 INJECTION INTRAVENOUS at 11:19

## 2020-02-10 RX ADMIN — DEXAMETHASONE SODIUM PHOSPHATE 10 MG: 4 INJECTION, SOLUTION INTRAMUSCULAR; INTRAVENOUS at 21:33

## 2020-02-10 RX ADMIN — INFLUENZA A VIRUS A/BRISBANE/02/2018 IVR-190 (H1N1) ANTIGEN (PROPIOLACTONE INACTIVATED), INFLUENZA A VIRUS A/KANSAS/14/2017 X-327 (H3N2) ANTIGEN (PROPIOLACTONE INACTIVATED), INFLUENZA B VIRUS B/MARYLAND/15/2016 ANTIGEN (PROPIOLACTONE INACTIVATED), INFLUENZA B VIRUS B/PHUKET/3073/2013 BVR-1B ANTIGEN (PROPIOLACTONE INACTIVATED) 0.5 ML: 15; 15; 15; 15 INJECTION, SUSPENSION INTRAMUSCULAR at 08:21

## 2020-02-10 RX ADMIN — PROPOFOL 100 MG: 10 INJECTION, EMULSION INTRAVENOUS at 11:14

## 2020-02-10 RX ADMIN — PANTOPRAZOLE SODIUM 40 MG: 40 INJECTION, POWDER, FOR SOLUTION INTRAVENOUS at 08:21

## 2020-02-10 RX ADMIN — Medication 5 MG: at 11:18

## 2020-02-10 RX ADMIN — Medication 10 ML: at 08:22

## 2020-02-10 RX ADMIN — SODIUM CHLORIDE: 9 INJECTION, SOLUTION INTRAVENOUS at 11:21

## 2020-02-10 RX ADMIN — DEXAMETHASONE SODIUM PHOSPHATE 4 MG: 4 INJECTION, SOLUTION INTRAMUSCULAR; INTRAVENOUS at 11:25

## 2020-02-10 RX ADMIN — PHENYLEPHRINE HYDROCHLORIDE 100 MCG: 10 INJECTION INTRAVENOUS at 11:26

## 2020-02-10 RX ADMIN — LIDOCAINE HYDROCHLORIDE 60 MG: 20 INJECTION, SOLUTION EPIDURAL; INFILTRATION; INTRACAUDAL; PERINEURAL at 11:15

## 2020-02-10 RX ADMIN — PROPOFOL 50 MG: 10 INJECTION, EMULSION INTRAVENOUS at 11:22

## 2020-02-10 RX ADMIN — Medication 10 ML: at 21:44

## 2020-02-10 ASSESSMENT — PULMONARY FUNCTION TESTS
PIF_VALUE: 16
PIF_VALUE: 0
PIF_VALUE: 15
PIF_VALUE: 15
PIF_VALUE: 16
PIF_VALUE: 9
PIF_VALUE: 4
PIF_VALUE: 4
PIF_VALUE: 18
PIF_VALUE: 12
PIF_VALUE: 18
PIF_VALUE: 16
PIF_VALUE: 0
PIF_VALUE: 22
PIF_VALUE: 0
PIF_VALUE: 12
PIF_VALUE: 16
PIF_VALUE: 11
PIF_VALUE: 12
PIF_VALUE: 6
PIF_VALUE: 14
PIF_VALUE: 17
PIF_VALUE: 5
PIF_VALUE: 16
PIF_VALUE: 8
PIF_VALUE: 16
PIF_VALUE: 0
PIF_VALUE: 7
PIF_VALUE: 4
PIF_VALUE: 26
PIF_VALUE: 19
PIF_VALUE: 15
PIF_VALUE: 16
PIF_VALUE: 15
PIF_VALUE: 0
PIF_VALUE: 18
PIF_VALUE: 12
PIF_VALUE: 16
PIF_VALUE: 16
PIF_VALUE: 3
PIF_VALUE: 13
PIF_VALUE: 16
PIF_VALUE: 4
PIF_VALUE: 9
PIF_VALUE: 1
PIF_VALUE: 16
PIF_VALUE: 16
PIF_VALUE: 8
PIF_VALUE: 0
PIF_VALUE: 2
PIF_VALUE: 11
PIF_VALUE: 1
PIF_VALUE: 8
PIF_VALUE: 7
PIF_VALUE: 11
PIF_VALUE: 10
PIF_VALUE: 9
PIF_VALUE: 1
PIF_VALUE: 15
PIF_VALUE: 14
PIF_VALUE: 9
PIF_VALUE: 12
PIF_VALUE: 6
PIF_VALUE: 12
PIF_VALUE: 16
PIF_VALUE: 16
PIF_VALUE: 8
PIF_VALUE: 17
PIF_VALUE: 14
PIF_VALUE: 16
PIF_VALUE: 7
PIF_VALUE: 16
PIF_VALUE: 7

## 2020-02-10 ASSESSMENT — PAIN SCALES - GENERAL
PAINLEVEL_OUTOF10: 6
PAINLEVEL_OUTOF10: 0

## 2020-02-10 ASSESSMENT — PAIN - FUNCTIONAL ASSESSMENT: PAIN_FUNCTIONAL_ASSESSMENT: 0-10

## 2020-02-10 NOTE — CONSULTS
INPATIENT PULMONARY CRITICAL CARE CONSULT NOTE      Chief Complaint/Referring Provider:  Patient is being seen at the request of Dr. Christine Arellano  for a consultation for abnormal CT chest      Presenting HPI: Patient was brought to the hopsital with increased fatigue and change in voice     As per ER provider-La Rudolph is a 60 y.o. female presents the emergency department with reports of nearly a month of difficulties as it pertains to increasing levels of fatigue malaise and weakness. Patient states that she has been seen and evaluated by her primary care physician. She was diagnosed with anemia but the exact cause of this has not yet been able to be determined. Patient states she has had some associated symptoms of mild shortness of breath that seems to be worse with any kind of dyspnea on exertion. She states she is not experiencing difficulties as a pertains to substernal chest pain. She goes on to report she is intermittent for symptoms of cough but states that it really is at regular and usual baseline. She goes on to report that she has had some recent difficulties with some diarrhea this been nonbloody but she states she is on sure if it has been dark and tarry because she is been placed on iron for the documented anemia. She states that she has had 2 bouts of diarrhea in the overnight hours. She has had recent antibiotics in the form of amoxicillin. She states that she denies that she is experiencing fevers and or chills. She denies crampy abdominal pain. She denies flank pain. She is equivocal for diminished urinary output. She denies dysuria, urgency, frequency. Patient states that she is on aware if she has had any significant weight loss or weight gain but does not believe that to be the case. In regards to her shortness of breath she does have a cancer history but denies that she is had unilateral leg pain or swelling.   She denies a history of DVT and or PE in the past.  She obviously has increased risk factors related to the potential.  Patient states she is not having headache pain. She does not have any visual disturbances.   She states she does not have significant pain and discomfort and with this she presents for evaluation and treatment    Patient has been having some increasing change in her voice quality and patient's voice has become quite hoarse and raspy which is progressively worse for last few weeks time along with that patient also has been having increased weakness and tiredness along with that patient also has been losing weight and in the last few weeks time patient has lost about 10 to 15 pounds as per the family, patient also started having increased tiredness and her activities of daily living were decreasing, patient has been having some cough without expectoration, patient did not have any increasing shortness of breath or any chest pain, patient did not have any pleuritic chest pain or any palpitation or diaphoresis,, patient did not have any significant epistaxis or hemoptysis, patient was having have some headaches, patient does not have any odynophagia or dysphagia but patient feels raw in the posterior pharynx and was having some difficulty in swallowing because of that, patient did not have any dizziness, patient was not having any significant abdominal pain nausea vomiting or any dysuria or hematuria, no increasing leg edema, patient did not have any focal motor deficits per se, patient has history of breast cancer status post left mastectomy but as per patient and family she did not require any chemoradiation therapy, patient also has history of colon cancer in the past, patient was a smoker but she quit in October of last year, no other pertinent review of system of concern       Patient Active Problem List    Diagnosis Date Noted    Mild malnutrition ([address] 02/10/2020    Cavitating mass in left upper lung lobe 02/10/2020    Pulmonary metastases ([address] 02/10/2020    Hoarseness of voice 02/10/2020    Former smoker 02/10/2020    Pleural effusion on left 02/10/2020    Generalized weakness 2020    Malignant neoplasm of left female breast ([address]     Closed fracture of cuboid bone of foot 2014    Foot pain 2014       Past Medical History:   Diagnosis Date    Arachnoiditis     Arthritis     Cancer ([address]     colon, PRIMARY BREAST        Past Surgical History:   Procedure Laterality Date    BACK SURGERY      BRONCHOSCOPY N/A 2/10/2020    BRONCHOSCOPY W/EBUS FNA performed by Jak Major MD at [address] N/A 2/10/2020    BRONCHOSCOPY BRUSHINGS performed by Jak Major MD at [address] N/A 2/10/2020    BRONCHOSCOPY ALVEOLAR LAVAGE performed by Jak Major MD at [address] N/A 2/10/2020    BRONCHOSCOPY ENDOBRONCHIAL ULTRASOUND performed by Jak Major MD at [address] Left 10/04/2017    LEFT BREAST TOTAL MASTECTOMY WITH SENTINEL LYMPH NODE BIOPSY        Family History   Problem Relation Age of Onset    Arthritis Other     Cancer Other     Diabetes Other     Heart Disease Other     High Blood Pressure Other         Social History     Tobacco Use    Smoking status: Former Smoker     Packs/day: 1.00     Types: Cigarettes     Last attempt to quit: 10/1/2019     Years since quittin.3    Smokeless tobacco: Never Used   Substance Use Topics    Alcohol use: Yes     Comment: OCCASIONAL GLASS OF WINE        No Known Allergies            Physical Exam:  Blood pressure 99/65, pulse 99, temperature 98.4 °F (36.9 °C), temperature source Oral, resp. rate 18, height 5' 6\" (1.676 m), weight 119 lb (54 kg), last menstrual period 2011, SpO2 91 %, not currently breastfeeding.'     Constitutional:  No acute distress.  Hoarseness of voice present  HENT:  Oropharynx is clear and moist. No thyromegaly. Eyes:  Conjunctivae are normal. Pupils equal, round, and reactive to light. No scleral icterus. Neck: . No tracheal deviation present. No obvious thyroid mass. No conducted sounds  Cardiovascular: Normal rate, regular rhythm, normal heart sounds. No right ventricular heave. No lower extremity edema. Pulmonary/Chest: No wheezes. No rales. Chest wall is not dull to percussion. No accessory muscle usage or stridor. Decreased breath sound density  Abdominal: Soft. Bowel sounds present. No distension or hernia. No tenderness. Musculoskeletal: No cyanosis. No clubbing. No obvious joint deformity. Lymphadenopathy: No cervical or supraclavicular adenopathy. Skin: Skin is warm and dry. No rash or nodules on the exposed extremities. Psychiatric: Normal mood and affect. Behavior is normal.  No anxiety. Neurologic: Alert, awake and oriented. PERRL. Speech fluent        Results:  CBC:   Recent Labs     02/09/20  1132  02/10/20  0015 02/10/20  0604 02/10/20  0826   WBC 8.4  --   --  6.7  --    HGB 10.6*  --  10.1* 9.7* 10.2*   HCT 34.4*   < > 30.8* 30.0* 32.0*   MCV 79.5*  --   --  76.5*  --    *  --   --  517*  --     < > = values in this interval not displayed. BMP:   Recent Labs     02/09/20  1132 02/10/20  0604   * 134*   K 3.4* 4.4   CL 99 100   CO2 20* 21   BUN 14 10   CREATININE <0.5* <0.5*     LIVER PROFILE:   Recent Labs     02/09/20  1132   AST 13*   ALT 7*   BILITOT <0.2   ALKPHOS 106     PT/INR:   Recent Labs     02/09/20  1132   PROTIME 14.2*   INR 1.22*     APTT: No results for input(s): APTT in the last 72 hours. UA:  Recent Labs     02/10/20  0146   COLORU YELLOW   PHUR 6.5   WBCUA 9*   RBCUA 5*   CLARITYU CLOUDY*   SPECGRAV >1.030   LEUKOCYTESUR SMALL*   UROBILINOGEN 1.0   BILIRUBINUR Negative   BLOODU Negative   GLUCOSEU Negative       Imaging:  I have reviewed radiology images personally.     CT ABDOMEN PELVIS W IV CONTRAST Additional Contrast? None   Final Result   1. Negative for pulmonary embolus. 2. Infiltrating 13 cm left upper lobe bronchogenic carcinoma with extensive   pulmonary metastatic disease. 3. Negative for abdominopelvic metastatic disease. CT CHEST PULMONARY EMBOLISM W CONTRAST   Final Result   1. Negative for pulmonary embolus. 2. Infiltrating 13 cm left upper lobe bronchogenic carcinoma with extensive   pulmonary metastatic disease. 3. Negative for abdominopelvic metastatic disease. XR CHEST PORTABLE   Final Result   Large consolidated opacity left apex. This is indeterminate and may   represent pneumonia, however underlying mass lesion is not excluded. CT   chest with contrast recommended      Numerous lung nodules suggesting diffuse metastatic disease. MRI BRAIN W WO CONTRAST    (Results Pending)     Ct Abdomen Pelvis W Iv Contrast Additional Contrast? None    Result Date: 2/9/2020  EXAMINATION: CTA OF THE CHEST; CT OF THE ABDOMEN AND PELVIS WITH CONTRAST 2/9/2020 12:33 pm; 2/9/2020 12:39 pm TECHNIQUE: CTA of the chest was performed after the administration of intravenous contrast.  Multiplanar reformatted images are provided for review. MIP images are provided for review. Dose modulation, iterative reconstruction, and/or weight based adjustment of the mA/kV was utilized to reduce the radiation dose to as low as reasonably achievable.; CT of the abdomen and pelvis was performed with the administration of intravenous contrast. Multiplanar reformatted images are provided for review. Dose modulation, iterative reconstruction, and/or weight based adjustment of the mA/kV was utilized to reduce the radiation dose to as low as reasonably achievable. COMPARISON: None.  HISTORY: ORDERING SYSTEM PROVIDED HISTORY: r/o PE TECHNOLOGIST PROVIDED HISTORY: Reason for exam:->r/o PE Reason for Exam: r/o PE Acuity: Unknown Type of Exam: Unknown; ORDERING SYSTEM PROVIDED HISTORY: Crampy abdominal pain with diarrhea rule out colitis versus diverticulitis TECHNOLOGIST PROVIDED HISTORY: If patient is on cardiac monitor and/or pulse ox, they may be taken off cardiac monitor and pulse ox, left on O2 if currently on. All monitors reattached when patient returns to room. Additional Contrast?->None Reason for exam:->Crampy abdominal pain with diarrhea rule out colitis versus diverticulitis Reason for Exam: crampy abd pain Acuity: Unknown Type of Exam: Unknown FINDINGS: Pulmonary Arteries: Pulmonary arteries are adequately opacified for evaluation. No evidence of intraluminal filling defect to suggest pulmonary embolism. Main pulmonary artery is normal in caliber. Mediastinum: There is a large infiltrating soft tissue mass within the left upper lobe which extends into the left hilum resulting in encasement of the left mainstem bronchus and left main pulmonary artery. Soft tissue extends into the AP window and anterior mediastinum. The overall dimensions of the mass are 13 x 9.3 cm. There is bulky adenopathy within the right hilum and subcarinal space measuring 3.6 cm and 2.3 cm respectively. Lungs/pleura: Innumerable round soft tissue masses are scattered throughout each lung. Dominant mass within the left upper lobe again measures 13 cm with the majority of remaining nodules varying between 1 and 3 cm in diameter. There is a small left pleural effusion. Organs: Numerous small simple cysts are scattered throughout the liver. Postsurgical changes of cholecystectomy are present. The remainder of the solid abdominal organs are unremarkable. Bowel: There is no bowel dilatation, wall thickening or obstruction. Pelvis: The pelvic organs and urinary bladder are grossly unremarkable. Peritoneum/retroperitoneum: There is no free air, free fluid or intraperitoneal inflammatory change. There is no adenopathy. Soft Tissues/Bones: There is no fracture or aggressive osseous lesion.   Note is made the patient is status post left mastectomy and axillary dissection. 1. Negative for pulmonary embolus. 2. Infiltrating 13 cm left upper lobe bronchogenic carcinoma with extensive pulmonary metastatic disease. 3. Negative for abdominopelvic metastatic disease. Echocardiogram:None in Epic   PFT: None in Epic         Assessment:  Active Problems:    Malignant neoplasm of left female breast (HCC)    Generalized weakness    Mild malnutrition (HCC)    Cavitating mass in left upper lung lobe    Pulmonary metastases (HCC)    Hoarseness of voice    Former smoker    Pleural effusion on left  Resolved Problems:    * No resolved hospital problems.  *          Plan:   · Oxygen supplementation to keep saturation between 90 and 94% only  · Pulmonary toilet  · Patient and family were shown the CT scan of the chest along with findings differential diagnosis and implications  · Patient has a large lung mass in the left upper lobe along with that patient has extensive pulmonary metastasis  · Patient also has small left pleural effusion on CT of the chest  · Patient has history of breast cancer status post mastectomy without any chemoradiation therapy in the past  · Patient also has history of smoking till October 2019  · Patient's hoarseness of voice may be secondary to irritation or pressure on the left recurrent laryngeal nerve because of the lung mass  · Patient does not need any antibiotics from pulmonary standpoint of view  · Patient needs histopathological diagnosis to assess for etiology of the lung mass  · If patient has lung cancer, patient and family were told that it is not localized to an area  · Patient may require chemo or radiation therapy or both depending on the biopsy results  · Patient can be given bronchodilators on whenever necessary basis  · PUD and DVT prophylaxis as per IM    Case discussed with patient, family and nursing    Further management depending on patient's clinical status, follow-up on above recommendations along with bronchoscopy findings        Electronically signed by:  Renard Hernandez MD    2/10/2020    4:07 PM.

## 2020-02-10 NOTE — PROGRESS NOTES
Shift assessment complete. VSS. Scheduled medications given. Bed alarm on. Family at bedside. Will continue to monitor.   Vitals:    02/10/20 0820   BP: 104/70   Pulse: 77   Resp: 16   Temp: 98.2 °F (36.8 °C)   SpO2: 94%

## 2020-02-10 NOTE — CONSULTS
[facility]   CONSULT NOTE    2/10/2020 9:17 AM    Patient Information: Oneil PADILLA   Date of Admit:  2020  Primary Care Physician:  Page Vu MD  Requesting Physician:  Tate Carvajal MD    Reason for consult:   Evaluation and recommendations for lung mass    Chief complaint:    Chief Complaint   Patient presents with    Fatigue     onset 3 weeks. followed with PCP and was diagnosed with anemia. Pt states fatigue is worsening. History of Present Illness:  REMA PADILLA is a 60 y.o. female on [facility] Carvajal MD service who was admitted on 2020 for weakness, fatigue and dyspnea on exertion for the past 2-3 weeks. She has also noted voice hoarseness for 2-3 weeks. She reports recent 25 lb weight loss. She has been having headaches. She is not requiring oxygen. CTPA showed 13 cm left upper lobe mass with extensive metastatic disease in the left lung. No evidence of metastatic disease within the abdomen or pelvis. She is former smoker, quit it 2019. She was treated for grade 3 DCIS with left mastectomy in . Neoadjuvant chemotherapy was recommended, but she declined. She did not return to [facility] for follow up after her surgery. Her mother and sister also have a history of breast cancer. In , she was diagnosed with colon cancer, she had a bowel resection. No adjuvant therapy was indicated. Past Medical History:     has a past medical history of Arachnoiditis, Arthritis, and Cancer ([address]      Past Surgical History:    Past Surgical History:   Procedure Laterality Date    BACK SURGERY       SECTION      CHOLECYSTECTOMY      COLON SURGERY      MASTECTOMY Left 10/04/2017    LEFT BREAST TOTAL MASTECTOMY WITH SENTINEL LYMPH NODE BIOPSY        Current Medications:     sodium chloride flush  10 mL Intravenous 2 times per day    pantoprazole  40 mg Intravenous Daily       Allergies:    No Known Allergies     Social History:    reports that she quit smoking about 4 months ago. Her smoking use included cigarettes. She smoked 1.00 pack per day. She has never used smokeless tobacco. She reports current alcohol use. She reports that she does not use drugs. Family History:     family history includes Arthritis in an other family member; Cancer in an other family member; Diabetes in an other family member; Heart Disease in an other family member; High Blood Pressure in an other family member. ROS:      Constitutional: No weight loss, No fever, No chills, No night sweats. + Weakness, fatigue. 25 lb weight loss. Eyes: No diplopia, No transient or permanent loss of vision, No scotomata. ENT / Mouth: No epistaxis, No dysphagia, No oral ulcers, No gingival bleeding. No sore throat, No postnasal drip, No nasal drip, No mouth pain, No sinus pain, No tinnitus, Normal hearing. + Hoarseness. Cardiovascular: No chest pain, No palpitations, No syncope, No upper extremity edema, No lower extremity edema, No calf discomfort. Respiratory: No cough. No hemoptysis, No pleurisy, No wheezing. + Dyspnea on exertion. Gastrointestinal: No abdominal pain, No abdominal cramping, No nausea, No vomiting, No constipation, No diarrhea, No hemotochezia, No melena, No jaundice, No dyspepsia, No dysphagia. Urinary: No dysuria, No hematuria, No urinary incontinence. Musculoskeletal: No muscle pain, No swollen joints, No joint redness, No bone pain, No spine tenderness. Skin: No rash, No nodules, No pruritus, No lesions. Neurologic: No confusion, No seizures, No syncope, No tremor, No speech change, No headache, No hiccups, No abnormal gait, No sensory changes, No weakness. Psychiatric: No depression, No anxiety, Concentration normal.   Endocrine: No polyuria, No polydipsia, No hot flashes, No thyroid symptoms. Hematologic: No epistaxis, No gingival bleeding, No petechiae, No ecchymosis. Lymphatic: No lymphadenopathy, No lymphedema.    Allergy / Immunologic: No eczema, No frequent mucous infections, No frequent respiratory infections, No recurrent urticarial, No frequent skin infections. PHYSICAL EXAM:    Vitals:  Vitals:    02/10/20 0820   BP: 104/70   Pulse: 77   Resp: 16   Temp: 98.2 °F (36.8 °C)   SpO2: 94%        Intake/Output Summary (Last 24 hours) at 2/10/2020 0917  Last data filed at 2/10/2020 0800  Gross per 24 hour   Intake 240 ml   Output 425 ml   Net -185 ml      Wt Readings from Last 3 Encounters:   02/09/20 119 lb (54 kg)   10/04/17 130 lb 6.4 oz (59.1 kg)   09/27/17 130 lb (59 kg)        General appearance: Appears comfortable. Eyes: Sclera clear, pupils equal  ENT: Moist mucus membranes, no thrush  Neck: Trachea midline, symmetrical  Cardiovascular: Regular rhythm, normal S1, S2. No murmur, gallop, rub. No edema in  lower extremities  Respiratory: Clear to auscultation bilaterally. No wheeze. Good inspiratory effort  Gastrointestinal: Abdomen soft, not tender, not distended, normal bowel sounds  Musculoskeletal: No cyanosis in digits, warm extremities  Neurologic: Cranial nerves grossly intact, no motor or speech deficits. Psychiatric: Normal affect. Alert and oriented to time, place and person.   Skin: Warm, dry, normal turgor, no rash    DATA:  CBC:   Lab Results   Component Value Date    WBC 6.7 02/10/2020    RBC 3.92 02/10/2020    HGB 10.2 02/10/2020    HCT 32.0 02/10/2020    MCV 76.5 02/10/2020    MCH 24.6 02/10/2020    MCHC 32.2 02/10/2020    RDW 15.3 02/10/2020     02/10/2020    MPV 6.4 02/10/2020     BMP:  Lab Results   Component Value Date     02/10/2020    K 4.4 02/10/2020     02/10/2020    CO2 21 02/10/2020    BUN 10 02/10/2020    CREATININE <0.5 02/10/2020    CALCIUM 8.4 02/10/2020    GFRAA >60 02/10/2020    GFRAA >60 06/09/2011    LABGLOM >60 02/10/2020    GLUCOSE 119 02/10/2020     Magnesium:   Lab Results   Component Value Date    MG 2.00 02/09/2020     LIVER PROFILE:   Recent Labs     02/09/20  1132   AST 13*   ALT 7*   BILITOT <0.2   ALKPHOS 106     PT/INR:    Lab Results   Component Value Date    PROTIME 14.2 02/09/2020    INR 1.22 02/09/2020     IMAGING:    Narrative   EXAMINATION:   ONE XRAY VIEW OF THE CHEST       2/9/2020 11:23 am       COMPARISON:   None.       HISTORY:   ORDERING SYSTEM PROVIDED HISTORY: fatigue/anemia   TECHNOLOGIST PROVIDED HISTORY:   Reason for exam:->fatigue/anemia   Reason for Exam: fatigue/anemia   Acuity: Acute   Type of Exam: Initial   Relevant Medical/Surgical History: breast cancer (left breast)       FINDINGS:   Heart size is normal.  There is a large area opacification in the left apex. Numerous nodules are noted throughout both lungs.  There is also fullness in   the right hilar region suggesting hilar adenopathy.  Multiple surgical clips   project in the left chest wall and over the left hemithorax.           Impression   Large consolidated opacity left apex.  This is indeterminate and may   represent pneumonia, however underlying mass lesion is not excluded.  CT   chest with contrast recommended       Numerous lung nodules suggesting diffuse metastatic disease.         Narrative   EXAMINATION:   CTA OF THE CHEST; CT OF THE ABDOMEN AND PELVIS WITH CONTRAST 2/9/2020 12:33   pm; 2/9/2020 12:39 pm       TECHNIQUE:   CTA of the chest was performed after the administration of intravenous   contrast.  Multiplanar reformatted images are provided for review.  MIP   images are provided for review. Dose modulation, iterative reconstruction,   and/or weight based adjustment of the mA/kV was utilized to reduce the   radiation dose to as low as reasonably achievable.; CT of the abdomen and   pelvis was performed with the administration of intravenous contrast.   Multiplanar reformatted images are provided for review.  Dose modulation,   iterative reconstruction, and/or weight based adjustment of the mA/kV was   utilized to reduce the radiation dose to as low as reasonably achievable.     COMPARISON:   None.       HISTORY:   ORDERING SYSTEM PROVIDED HISTORY: r/o PE   TECHNOLOGIST PROVIDED HISTORY:   Reason for exam:->r/o PE   Reason for Exam: r/o PE   Acuity: Unknown   Type of Exam: Unknown; ORDERING SYSTEM PROVIDED HISTORY: Crampy abdominal   pain with diarrhea rule out colitis versus diverticulitis   TECHNOLOGIST PROVIDED HISTORY:   If patient is on cardiac monitor and/or pulse ox, they may be taken off   cardiac monitor and pulse ox, left on O2 if currently on. All monitors   reattached when patient returns to room. Additional Contrast?->None   Reason for exam:->Crampy abdominal pain with diarrhea rule out colitis versus   diverticulitis   Reason for Exam: crampy abd pain   Acuity: Unknown   Type of Exam: Unknown       FINDINGS:   Pulmonary Arteries: Pulmonary arteries are adequately opacified for   evaluation.  No evidence of intraluminal filling defect to suggest pulmonary   embolism.  Main pulmonary artery is normal in caliber.       Mediastinum: There is a large infiltrating soft tissue mass within the left   upper lobe which extends into the left hilum resulting in encasement of the   left mainstem bronchus and left main pulmonary artery.  Soft tissue extends   into the AP window and anterior mediastinum.  The overall dimensions of the   mass are 13 x 9.3 cm.  There is bulky adenopathy within the right hilum and   subcarinal space measuring 3.6 cm and 2.3 cm respectively.       Lungs/pleura: Innumerable round soft tissue masses are scattered throughout   each lung.  Dominant mass within the left upper lobe again measures 13 cm   with the majority of remaining nodules varying between 1 and 3 cm in   diameter.  There is a small left pleural effusion.       Organs: Numerous small simple cysts are scattered throughout the liver. Postsurgical changes of cholecystectomy are present.  The remainder of the   solid abdominal organs are unremarkable.       Bowel:  There is no bowel dilatation, wall thickening or obstruction.       Pelvis: The pelvic organs and urinary bladder are grossly unremarkable.       Peritoneum/retroperitoneum: There is no free air, free fluid or   intraperitoneal inflammatory change.  There is no adenopathy.       Soft Tissues/Bones: There is no fracture or aggressive osseous lesion.  Note   is made the patient is status post left mastectomy and axillary dissection.           Impression   1. Negative for pulmonary embolus. 2. Infiltrating 13 cm left upper lobe bronchogenic carcinoma with extensive   pulmonary metastatic disease. 3. Negative for abdominopelvic metastatic disease.         Narrative   EXAMINATION:   CTA OF THE CHEST; CT OF THE ABDOMEN AND PELVIS WITH CONTRAST 2/9/2020 12:33   pm; 2/9/2020 12:39 pm       TECHNIQUE:   CTA of the chest was performed after the administration of intravenous   contrast.  Multiplanar reformatted images are provided for review.  MIP   images are provided for review. Dose modulation, iterative reconstruction,   and/or weight based adjustment of the mA/kV was utilized to reduce the   radiation dose to as low as reasonably achievable.; CT of the abdomen and   pelvis was performed with the administration of intravenous contrast.   Multiplanar reformatted images are provided for review. Dose modulation,   iterative reconstruction, and/or weight based adjustment of the mA/kV was   utilized to reduce the radiation dose to as low as reasonably achievable.       COMPARISON:   None.       HISTORY:   ORDERING SYSTEM PROVIDED HISTORY: r/o PE   TECHNOLOGIST PROVIDED HISTORY:   Reason for exam:->r/o PE   Reason for Exam: r/o PE   Acuity: Unknown   Type of Exam: Unknown; ORDERING SYSTEM PROVIDED HISTORY: Crampy abdominal   pain with diarrhea rule out colitis versus diverticulitis   TECHNOLOGIST PROVIDED HISTORY:   If patient is on cardiac monitor and/or pulse ox, they may be taken off   cardiac monitor and pulse ox, left on O2 if currently on.  All monitors   reattached when patient returns to room. Additional Contrast?->None   Reason for exam:->Crampy abdominal pain with diarrhea rule out colitis versus   diverticulitis   Reason for Exam: crampy abd pain   Acuity: Unknown   Type of Exam: Unknown       FINDINGS:   Pulmonary Arteries: Pulmonary arteries are adequately opacified for   evaluation.  No evidence of intraluminal filling defect to suggest pulmonary   embolism.  Main pulmonary artery is normal in caliber.       Mediastinum: There is a large infiltrating soft tissue mass within the left   upper lobe which extends into the left hilum resulting in encasement of the   left mainstem bronchus and left main pulmonary artery.  Soft tissue extends   into the AP window and anterior mediastinum.  The overall dimensions of the   mass are 13 x 9.3 cm.  There is bulky adenopathy within the right hilum and   subcarinal space measuring 3.6 cm and 2.3 cm respectively.       Lungs/pleura: Innumerable round soft tissue masses are scattered throughout   each lung.  Dominant mass within the left upper lobe again measures 13 cm   with the majority of remaining nodules varying between 1 and 3 cm in   diameter.  There is a small left pleural effusion.       Organs: Numerous small simple cysts are scattered throughout the liver. Postsurgical changes of cholecystectomy are present.  The remainder of the   solid abdominal organs are unremarkable.       Bowel: There is no bowel dilatation, wall thickening or obstruction.       Pelvis: The pelvic organs and urinary bladder are grossly unremarkable.       Peritoneum/retroperitoneum: There is no free air, free fluid or   intraperitoneal inflammatory change.  There is no adenopathy.       Soft Tissues/Bones: There is no fracture or aggressive osseous lesion.  Note   is made the patient is status post left mastectomy and axillary dissection.           Impression   1. Negative for pulmonary embolus.    2. Infiltrating 13 cm left upper lobe bronchogenic carcinoma with extensive   pulmonary metastatic disease. 3. Negative for abdominopelvic metastatic disease.           IMPRESSION/RECOMMENDATIONS:    Active Problems:    Generalized weakness  Resolved Problems:    * No resolved hospital problems. *       Lung mass  - No evidence of abdominopelvic metastasis  - Will obtain MRI brain  - Need tissue biopsy for diagnosis  - Pulmonology consulted for bronchoscopy      Anemia  - Taking PO iron prior to admission  - Iron studies pending        History DCIS  - Left mastectomy in 2017      History of colon cancer  - S/p resection in 2008        This plan was discussed with the patient and he/she verbalized understanding. Thank you for allowing us to participate in the care of this patient. Paris Chua, [facility]  Oncology Hematology [address] [facility].  [phone]    Patient was seen with QUAN this morning. Presented with respiratory symptoms and weight loss. History of tobacco use. History of DCIS and colon cancer. Reviewed actual CT images with the patient. Large left upper lobe mass extending to the left hilum, bilateral pulmonary masses and left pleural effusion. Will obtain MRI brain. Discussed with pulmonary service about bronchoscopy and biopsy. Lenora Negron.  Fanny Gallego MD, [place] [address]  Hematology and Oncology  [facility]  [phone]

## 2020-02-10 NOTE — PROGRESS NOTES
Bedside rounding completed with Matilde Staley RN. Pt denies needs at this time. White board updated. Call light in hand and pt verbalizes correct use. All needs within reach. Bed alarm set.

## 2020-02-10 NOTE — CARE COORDINATION
DCPA attempted. 5 family members at bedside and pt asked for SW to revisit. SW to follow.     Kenney Nieto MSW, [address]

## 2020-02-10 NOTE — PROGRESS NOTES
Routine vitals stable. Scheduled medications given. Pt denies any further needs at this time. Call light within reach. Bed alarm on. Will continue to monitor.    Vitals:    02/10/20 1819   BP: 99/65   Pulse: 86   Resp: 18   Temp: 98.5 °F (36.9 °C)   SpO2: 90%

## 2020-02-10 NOTE — ANESTHESIA POSTPROCEDURE EVALUATION
Department of Anesthesiology  Postprocedure Note    Patient: Sanjay Vargas  MRN: [id number]  YOB: 1960  Date of evaluation: 2/10/2020  Time:  3:48 PM     Procedure Summary     Date:  02/10/20 Room / Location:  [address] [place] [address] / [facility]    Anesthesia Start:  1107 Anesthesia Stop:  1080    Procedures:       BRONCHOSCOPY W/EBUS FNA (N/A )      BRONCHOSCOPY BRUSHINGS (N/A Abdomen)      BRONCHOSCOPY ALVEOLAR LAVAGE (N/A Abdomen)      BRONCHOSCOPY ENDOBRONCHIAL ULTRASOUND (N/A ) Diagnosis:  (Abnormal CT scan)    Surgeon:  Sharad Wayne MD Responsible Provider:  Pierce Barba MD    Anesthesia Type:  general ASA Status:  3          Anesthesia Type: general    Phil Phase I: Phil Score: 10    Phil Phase II:      Last vitals: Reviewed and per EMR flowsheets.        Anesthesia Post Evaluation    Patient location during evaluation: PACU  Patient participation: complete - patient participated  Level of consciousness: awake  Airway patency: patent  Nausea & Vomiting: no vomiting  Complications: no  Cardiovascular status: hemodynamically stable  Respiratory status: acceptable  Hydration status: euvolemic

## 2020-02-10 NOTE — PROGRESS NOTES
Nutrition Assessment    Type and Reason for Visit: Positive Nutrition Screen(MST 2, poor appetite, weight loss )    Nutrition Recommendations:   1. Trial Ensure Enlive BID  2. Encourage PO intake  3. Document all PO intake in flow sheets     Nutrition Assessment: Pt is nutritionally compromised as evidenced by decreased appetite and PO intake over the past 4 weeks d/t not feeling well. Pt reports 11 lb (8.5%) weight loss ( lbs) but is unsure of time period of weight loss. Pt states she feels like her appetite has recently started to improve; stated she was \"starving\" and asked this RD to order lunch. Pt agreeable to trial Ensure Enlive BID. Will monitor for adequate PO intake and tolerance to supplement. Malnutrition Assessment:  · Malnutrition Status: Mild Malnutrition  · Context: Chronic illness  · Findings of the 6 clinical characteristics of malnutrition (Minimum of 2 out of 6 clinical characteristics is required to make the diagnosis of moderate or severe Protein Calorie Malnutrition based on AND/ASPEN Guidelines):  1. Energy Intake-Less than or equal to 75% of estimated energy requirement, Greater than or equal to 1 month    2. Weight Loss-7.5% loss or greater(Per pt report ), unable to assess  3. Fat Loss-No significant subcutaneous fat loss,    4. Muscle Loss-Mild muscle mass loss, Temples (temporalis muscle), Clavicles (pectoralis and deltoids)  5. Fluid Accumulation-No significant fluid accumulation,    6.  Strength-Not measured    Nutrition Risk Level:  Moderate    Nutrient Needs:  · Estimated Daily Total Kcal: 2645-0985   · Estimated Daily Protein (g): 65-81 grams   · Estimated Daily Total Fluid (ml/day): 1 mL per kcal     Nutrition Diagnosis:   · Problem: Inadequate oral intake  · Etiology: related to Insufficient energy/nutrient consumption     Signs and symptoms:  as evidenced by Diet history of poor intake, Weight loss    Objective Information:  · Nutrition-Focused Physical Findings: No edema noted   · Wound Type: None  · Current Nutrition Therapies:  · Oral Diet Orders: General   · Oral Diet intake: Unable to assess(No PO intake yet )  · Oral Nutrition Supplement (ONS) Orders: None  · Anthropometric Measures:  · Ht: 5' 6\" (167.6 cm)   · Current Body Wt: 119 lb (54 kg)  · % Weight Change:  ,  11 lb (8.5%) weight loss; pt unsure of time period   · Ideal Body Wt: 130 lb (59 kg)   · BMI Classification: BMI 18.5 - 24.9 Normal Weight    Nutrition Interventions:   Continue current diet, Start ONS  Continued Inpatient Monitoring, Education Not Indicated    Nutrition Evaluation:   · Evaluation: Goals set   · Goals: Pt will consume at least 50% of meals and supplements     · Monitoring: Meal Intake, Supplement Intake, Diet Tolerance, Weight      Electronically signed by Asia Mtz RD, LD on 2/10/20 at 2:22 PM    Contact Number: [phone]

## 2020-02-10 NOTE — ANESTHESIA PRE PROCEDURE
Department of Anesthesiology  Preprocedure Note       Name:  Svitlana Aragon   Age:  60 y.o.  :  1960                                          MRN:  [id number]         Date:  2/10/2020      Surgeon: Sri Real):  Ashanti Lee MD    Procedure: BRONCHOSCOPY ENDOBRONCHIAL ULTRASOUND (N/A )    Medications prior to admission:   Prior to Admission medications    Medication Sig Start Date End Date Taking?  Authorizing Provider   amoxicillin (AMOXIL) 250 MG capsule Take 250 mg by mouth 3 times daily 20  Yes Historical Provider, MD   ferrous sulfate 325 (65 Fe) MG tablet Take 325 mg by mouth daily (with breakfast)   Yes Historical Provider, MD       Current medications:    Current Facility-Administered Medications   Medication Dose Route Frequency Provider Last Rate Last Dose    acetaminophen (TYLENOL) tablet 650 mg  650 mg Oral Q4H PRN Hugh Reed PA-C   650 mg at 02/10/20 0256    HYDROmorphone (DILAUDID) injection 0.25 mg  0.25 mg Intravenous Q5 Min PRN All Grady MD        fentaNYL (SUBLIMAZE) injection 50 mcg  50 mcg Intravenous Q5 Min PRN All Grady MD        HYDROmorphone (DILAUDID) injection 0.25 mg  0.25 mg Intravenous Q5 Min PRN All Grady MD        HYDROmorphone (DILAUDID) injection 0.5 mg  0.5 mg Intravenous Q5 Min PRN All Grady MD        oxyCODONE (ROXICODONE) immediate release tablet 5 mg  5 mg Oral PRN All Grady MD        Or    oxyCODONE (ROXICODONE) immediate release tablet 10 mg  10 mg Oral PRN All Grady MD        diphenhydrAMINE (BENADRYL) injection 12.5 mg  12.5 mg Intravenous Once PRN All Grady MD        promethazine (PHENERGAN) injection 6.25 mg  6.25 mg Intravenous PRN All Grady MD        labetalol (NORMODYNE;TRANDATE) injection 5 mg  5 mg Intravenous Q10 Min PRN All Grady MD        meperidine (DEMEROL) injection 12.5 mg  12.5 mg Intravenous Q5 Min PRN All Grady MD        sodium chloride flush 0.9 % injection 10 mL  10 mL Intravenous 2 times per day Kenny Rodriguez MD   10 mL at 02/10/20 7768    sodium chloride flush 0.9 % injection 10 mL  10 mL Intravenous PRN Kenny Rodriguez MD        magnesium hydroxide (MILK OF MAGNESIA) 400 MG/5ML suspension 30 mL  30 mL Oral Daily PRN Kenny Rodriguez MD        ondansetron [facility]) injection 4 mg  4 mg Intravenous Q6H PRN Kenny Rodriguez MD        ipratropium-albuterol (DUONEB) nebulizer solution 1 ampule  1 ampule Inhalation Q4H PRN Kenny Rodriguez MD        pantoprazole (PROTONIX) injection 40 mg  40 mg Intravenous Daily Kenny Rodriguez MD   40 mg at 02/10/20 3556       Allergies:  No Known Allergies    Problem List:    Patient Active Problem List   Diagnosis Code    Foot pain M79.673    Closed fracture of cuboid bone of foot S92.213A    Malignant neoplasm of left female breast ([facility] [address] C50.912    Generalized weakness R53.1       Past Medical History:        Diagnosis Date    Arachnoiditis     Arthritis     Cancer ([facility] [address]     colon, PRIMARY BREAST       Past Surgical History:        Procedure Laterality Date    BACK SURGERY       SECTION      CHOLECYSTECTOMY      COLON SURGERY      MASTECTOMY Left 10/04/2017    LEFT BREAST TOTAL MASTECTOMY WITH SENTINEL LYMPH NODE BIOPSY       Social History:    Social History     Tobacco Use    Smoking status: Former Smoker     Packs/day: 1.00     Types: Cigarettes     Last attempt to quit: 10/1/2019     Years since quittin.3    Smokeless tobacco: Never Used   Substance Use Topics    Alcohol use: Yes     Comment: OCCASIONAL GLASS OF WINE                                Counseling given: Not Answered      Vital Signs (Current):   Vitals:    02/10/20 0200 02/10/20 0435 02/10/20 0820 02/10/20 1017   BP:  116/76 104/70 (!) 144/89   Pulse: 80 80 77 87   Resp:  18 16 20   Temp:  98.3 °F (36.8 °C) 98.2 °F (36.8 °C) 99.3 °F (37.4 °C)   TempSrc:  Oral Oral Temporal   SpO2:  92% 94% 95%   Weight:       Height: BP Readings from Last 3 Encounters:   02/10/20 (!) 144/89   10/10/17 132/88   10/05/17 138/83       NPO Status:                                                                                 BMI:   Wt Readings from Last 3 Encounters:   02/09/20 119 lb (54 kg)   10/04/17 130 lb 6.4 oz (59.1 kg)   09/27/17 130 lb (59 kg)     Body mass index is 19.21 kg/m². CBC:   Lab Results   Component Value Date    WBC 6.7 02/10/2020    RBC 3.92 02/10/2020    HGB 10.2 02/10/2020    HCT 32.0 02/10/2020    MCV 76.5 02/10/2020    RDW 15.3 02/10/2020     02/10/2020       CMP:   Lab Results   Component Value Date     02/10/2020    K 4.4 02/10/2020     02/10/2020    CO2 21 02/10/2020    BUN 10 02/10/2020    CREATININE <0.5 02/10/2020    GFRAA >60 02/10/2020    GFRAA >60 06/09/2011    AGRATIO 0.8 02/09/2020    LABGLOM >60 02/10/2020    GLUCOSE 119 02/10/2020    PROT 7.1 02/09/2020    CALCIUM 8.4 02/10/2020    BILITOT <0.2 02/09/2020    ALKPHOS 106 02/09/2020    AST 13 02/09/2020    ALT 7 02/09/2020       POC Tests: No results for input(s): POCGLU, POCNA, POCK, POCCL, POCBUN, POCHEMO, POCHCT in the last 72 hours. Coags:   Lab Results   Component Value Date    PROTIME 14.2 02/09/2020    INR 1.22 02/09/2020       HCG (If Applicable): No results found for: PREGTESTUR, PREGSERUM, HCG, HCGQUANT     ABGs: No results found for: PHART, PO2ART, EVD3ONK, GIV7PAT, BEART, M0ABLLPL     Type & Screen (If Applicable):  No results found for: LABABO, LABRH    Anesthesia Evaluation    Airway: Mallampati: II  TM distance: >3 FB   Neck ROM: full  Mouth opening: > = 3 FB Dental:          Pulmonary:                              Cardiovascular:            Rhythm: regular  Rate: normal                    Neuro/Psych:               GI/Hepatic/Renal:             Endo/Other:    (+) malignancy/cancer.                  Abdominal:           Vascular:                                        Anesthesia Plan      general     ASA 3       Induction: intravenous. Anesthetic plan and risks discussed with patient. Plan discussed with CRNA.                   Rebecca Eugene MD   2/10/2020

## 2020-02-10 NOTE — PROGRESS NOTES
Pt ready for transfer to room 5578.  5T RN called. Pt's belongings taken up with the pt. Family at bedside.

## 2020-02-10 NOTE — PROGRESS NOTES
Hospitalist Progress Note      PCP: Anastacio Woo MD    Date of Admission: 2/9/2020    Chief Complaint:  fatigue and shortness of breath. Hospital Course: Admitted with above symptoms. Pulmonology consulted. Subjective: Had bronchoscopy this morning. Now resting in the bed.  at bedside. Medications:  Reviewed    Infusion Medications   Scheduled Medications    sodium chloride flush  10 mL Intravenous 2 times per day    pantoprazole  40 mg Intravenous Daily     PRN Meds: acetaminophen, sodium chloride flush, magnesium hydroxide, ondansetron, ipratropium-albuterol      Intake/Output Summary (Last 24 hours) at 2/10/2020 1650  Last data filed at 2/10/2020 1430  Gross per 24 hour   Intake 990 ml   Output 425 ml   Net 565 ml       Physical Exam Performed:    BP 99/65   Pulse 99   Temp 98.4 °F (36.9 °C) (Oral)   Resp 18   Ht 5' 6\" (1.676 m)   Wt 119 lb (54 kg)   LMP 06/01/2011   SpO2 91%   BMI 19.21 kg/m²     General appearance: No apparent distress, appears stated age and cooperative. HEENT: Pupils equal, round, and reactive to light. Conjunctivae/corneas clear. Neck: Supple, with full range of motion. No jugular venous distention. Trachea midline. Respiratory:  Normal respiratory effort. Clear to auscultation, bilaterally without Rales/Wheezes/Rhonchi. Cardiovascular: Regular rate and rhythm with normal S1/S2 without murmurs, rubs or gallops. Abdomen: Soft, non-tender, non-distended with normal bowel sounds. Musculoskeletal: No clubbing, cyanosis or edema bilaterally. Full range of motion without deformity. Skin: Skin color, texture, turgor normal.  No rashes or lesions. Neurologic:  Neurovascularly intact without any focal sensory/motor deficits.  Cranial nerves: II-XII intact, grossly non-focal.  Psychiatric: Alert and oriented, thought content appropriate, normal insight  Capillary Refill: Brisk,< 3 seconds   Peripheral Pulses: +2 palpable, equal bilaterally       Labs: Recent Labs     02/09/20  1132  02/10/20  0604 02/10/20  0826 02/10/20  1614   WBC 8.4  --  6.7  --   --    HGB 10.6*   < > 9.7* 10.2* 9.9*   HCT 34.4*   < > 30.0* 32.0* 31.8*   *  --  517*  --   --     < > = values in this interval not displayed. Recent Labs     02/09/20  1132 02/10/20  0604   * 134*   K 3.4* 4.4   CL 99 100   CO2 20* 21   BUN 14 10   CREATININE <0.5* <0.5*   CALCIUM 8.8 8.4     Recent Labs     02/09/20  1132   AST 13*   ALT 7*   BILITOT <0.2   ALKPHOS 106     Recent Labs     02/09/20  1132   INR 1.22*     Recent Labs     02/09/20  1132   TROPONINI <0.01       Urinalysis:      Lab Results   Component Value Date    NITRU Negative 02/10/2020    WBCUA 9 02/10/2020    RBCUA 5 02/10/2020    BLOODU Negative 02/10/2020    SPECGRAV >1.030 02/10/2020    GLUCOSEU Negative 02/10/2020    GLUCOSEU NEGATIVE 06/09/2011       Radiology:  CT ABDOMEN PELVIS W IV CONTRAST Additional Contrast? None   Final Result   1. Negative for pulmonary embolus. 2. Infiltrating 13 cm left upper lobe bronchogenic carcinoma with extensive   pulmonary metastatic disease. 3. Negative for abdominopelvic metastatic disease. CT CHEST PULMONARY EMBOLISM W CONTRAST   Final Result   1. Negative for pulmonary embolus. 2. Infiltrating 13 cm left upper lobe bronchogenic carcinoma with extensive   pulmonary metastatic disease. 3. Negative for abdominopelvic metastatic disease. XR CHEST PORTABLE   Final Result   Large consolidated opacity left apex. This is indeterminate and may   represent pneumonia, however underlying mass lesion is not excluded. CT   chest with contrast recommended      Numerous lung nodules suggesting diffuse metastatic disease.          MRI BRAIN W WO CONTRAST    (Results Pending)           Assessment/Plan:    Active Hospital Problems    Diagnosis    Mild malnutrition ([address] [E44.1]    Cavitating mass in left upper lung lobe [J98.4]    Pulmonary metastases ([address] [C78.00]    Hoarseness of voice [R49.0]    Former smoker [Z87.891]    Pleural effusion on left [J90]    Generalized weakness [R53.1]    Malignant neoplasm of left female breast ([address] [C50.912]     New 13cm CLAYTON lung mass consistent with metastatic bronchiogenic carcinoma.  -.   Status post bronchoscopy-  Prelim Positive for non-small cell lung cancer. Await biopsy results.  -Oncology consulted. Await recommendations.     History of breast cancer s/p mastectomy. No chemo / radiation.     Acute vs chronic anemia. Last Hgb on file from 2011 which was normal.  No gross foci of bleeding  Dark diarrhea episode x2.   Suspect 2/2 iron replacement  - recently started on iron replacement therapy as outpt  - FOBT neg in ED  - empiric protonix 40 qday for now  -Continue to monitor H&H.     Generalized weaknesss 2/2 above     DVT Prophylaxis: scds     Diet: DIET GENERAL;  Dietary Nutrition Supplements: Standard High Calorie Oral Supplement  Code Status: Full Code    PT/OT Eval Status: not needed    Dispo - inpt     Sherrie Medrano MD

## 2020-02-10 NOTE — PROGRESS NOTES
Pt arrived from Endo to PACU bay 7. Report received from OR staff. Pt arouses easily to voice. 6L Simple mask. NSR, VSS. Will continue to monitor.

## 2020-02-10 NOTE — PROGRESS NOTES
Pt returned to the unit. Oxygen 87% on room air. Put pt on 2L, O2 now at 91%. Bed alarm on. Will continue to monitor.

## 2020-02-11 ENCOUNTER — HOSPITAL ENCOUNTER (INPATIENT)
Age: 60
LOS: 2 days | Discharge: HOME | DRG: 054 | End: 2020-02-13
Attending: INTERNAL MEDICINE | Admitting: INTERNAL MEDICINE
Payer: COMMERCIAL

## 2020-02-11 ENCOUNTER — APPOINTMENT (OUTPATIENT)
Dept: CT IMAGING | Age: 60
DRG: 054 | End: 2020-02-11
Attending: INTERNAL MEDICINE
Payer: COMMERCIAL

## 2020-02-11 LAB
ANION GAP SERPL CALCULATED.3IONS-SCNC: 13 MMOL/L (ref 3–16)
BUN BLDV-MCNC: 9 MG/DL (ref 7–20)
CALCIUM SERPL-MCNC: 9.1 MG/DL (ref 8.3–10.6)
CHLORIDE BLD-SCNC: 100 MMOL/L (ref 99–110)
CHOLESTEROL, TOTAL: 144 MG/DL (ref 0–199)
CO2: 22 MMOL/L (ref 21–32)
CREAT SERPL-MCNC: <0.5 MG/DL (ref 0.6–1.2)
GFR AFRICAN AMERICAN: >60
GFR NON-AFRICAN AMERICAN: >60
GLUCOSE BLD-MCNC: 154 MG/DL (ref 70–99)
HCT VFR BLD CALC: 29.5 % (ref 36–48)
HDLC SERPL-MCNC: 41 MG/DL (ref 40–60)
HEMOGLOBIN: 9.4 G/DL (ref 12–16)
INR BLD: 1.09 (ref 0.86–1.14)
LDL CHOLESTEROL CALCULATED: 85 MG/DL
MCH RBC QN AUTO: 24.6 PG (ref 26–34)
MCHC RBC AUTO-ENTMCNC: 31.9 G/DL (ref 31–36)
MCV RBC AUTO: 77 FL (ref 80–100)
ORGANISM: ABNORMAL
PDW BLD-RTO: 15.4 % (ref 12.4–15.4)
PLATELET # BLD: 553 K/UL (ref 135–450)
PMV BLD AUTO: 6.5 FL (ref 5–10.5)
POTASSIUM SERPL-SCNC: 4.9 MMOL/L (ref 3.5–5.1)
PROTHROMBIN TIME: 12.7 SEC (ref 10–13.2)
RBC # BLD: 3.83 M/UL (ref 4–5.2)
SODIUM BLD-SCNC: 135 MMOL/L (ref 136–145)
TRIGL SERPL-MCNC: 89 MG/DL (ref 0–150)
URINE CULTURE, ROUTINE: ABNORMAL
VLDLC SERPL CALC-MCNC: 18 MG/DL
WBC # BLD: 5.3 K/UL (ref 4–11)

## 2020-02-11 PROCEDURE — 36415 COLL VENOUS BLD VENIPUNCTURE: CPT

## 2020-02-11 PROCEDURE — 85027 COMPLETE CBC AUTOMATED: CPT

## 2020-02-11 PROCEDURE — 77417 THER RADIOLOGY PORT IMAGE(S): CPT

## 2020-02-11 PROCEDURE — 97535 SELF CARE MNGMENT TRAINING: CPT

## 2020-02-11 PROCEDURE — 97116 GAIT TRAINING THERAPY: CPT

## 2020-02-11 PROCEDURE — 99223 1ST HOSP IP/OBS HIGH 75: CPT | Performed by: INTERNAL MEDICINE

## 2020-02-11 PROCEDURE — 77300 RADIATION THERAPY DOSE PLAN: CPT

## 2020-02-11 PROCEDURE — 97166 OT EVAL MOD COMPLEX 45 MIN: CPT

## 2020-02-11 PROCEDURE — 77295 3-D RADIOTHERAPY PLAN: CPT

## 2020-02-11 PROCEDURE — 86901 BLOOD TYPING SEROLOGIC RH(D): CPT

## 2020-02-11 PROCEDURE — 77412 RADIATION TX DELIVERY LVL 3: CPT

## 2020-02-11 PROCEDURE — 86850 RBC ANTIBODY SCREEN: CPT

## 2020-02-11 PROCEDURE — C9113 INJ PANTOPRAZOLE SODIUM, VIA: HCPCS | Performed by: STUDENT IN AN ORGANIZED HEALTH CARE EDUCATION/TRAINING PROGRAM

## 2020-02-11 PROCEDURE — 6360000002 HC RX W HCPCS: Performed by: STUDENT IN AN ORGANIZED HEALTH CARE EDUCATION/TRAINING PROGRAM

## 2020-02-11 PROCEDURE — 97530 THERAPEUTIC ACTIVITIES: CPT

## 2020-02-11 PROCEDURE — 85610 PROTHROMBIN TIME: CPT

## 2020-02-11 PROCEDURE — 2580000003 HC RX 258: Performed by: STUDENT IN AN ORGANIZED HEALTH CARE EDUCATION/TRAINING PROGRAM

## 2020-02-11 PROCEDURE — 77334 RADIATION TREATMENT AID(S): CPT

## 2020-02-11 PROCEDURE — 77290 THER RAD SIMULAJ FIELD CPLX: CPT

## 2020-02-11 PROCEDURE — 6370000000 HC RX 637 (ALT 250 FOR IP): Performed by: STUDENT IN AN ORGANIZED HEALTH CARE EDUCATION/TRAINING PROGRAM

## 2020-02-11 PROCEDURE — 1200000000 HC SEMI PRIVATE

## 2020-02-11 PROCEDURE — 99221 1ST HOSP IP/OBS SF/LOW 40: CPT | Performed by: NURSE PRACTITIONER

## 2020-02-11 PROCEDURE — 92610 EVALUATE SWALLOWING FUNCTION: CPT

## 2020-02-11 PROCEDURE — 80048 BASIC METABOLIC PNL TOTAL CA: CPT

## 2020-02-11 PROCEDURE — 80061 LIPID PANEL: CPT

## 2020-02-11 PROCEDURE — 97162 PT EVAL MOD COMPLEX 30 MIN: CPT

## 2020-02-11 PROCEDURE — 92526 ORAL FUNCTION THERAPY: CPT

## 2020-02-11 PROCEDURE — 70450 CT HEAD/BRAIN W/O DYE: CPT

## 2020-02-11 PROCEDURE — 86900 BLOOD TYPING SEROLOGIC ABO: CPT

## 2020-02-11 PROCEDURE — 83036 HEMOGLOBIN GLYCOSYLATED A1C: CPT

## 2020-02-11 RX ORDER — DEXAMETHASONE SODIUM PHOSPHATE 4 MG/ML
10 INJECTION, SOLUTION INTRA-ARTICULAR; INTRALESIONAL; INTRAMUSCULAR; INTRAVENOUS; SOFT TISSUE EVERY 6 HOURS
Status: DISCONTINUED | OUTPATIENT
Start: 2020-02-11 | End: 2020-02-11

## 2020-02-11 RX ORDER — DEXAMETHASONE SODIUM PHOSPHATE 4 MG/ML
4 INJECTION, SOLUTION INTRA-ARTICULAR; INTRALESIONAL; INTRAMUSCULAR; INTRAVENOUS; SOFT TISSUE EVERY 6 HOURS
Status: DISCONTINUED | OUTPATIENT
Start: 2020-02-11 | End: 2020-02-13 | Stop reason: HOSPADM

## 2020-02-11 RX ORDER — FAMOTIDINE 20 MG/1
20 TABLET, FILM COATED ORAL 2 TIMES DAILY
Status: DISCONTINUED | OUTPATIENT
Start: 2020-02-11 | End: 2020-02-11

## 2020-02-11 RX ORDER — DEXAMETHASONE SODIUM PHOSPHATE 4 MG/ML
10 INJECTION, SOLUTION INTRA-ARTICULAR; INTRALESIONAL; INTRAMUSCULAR; INTRAVENOUS; SOFT TISSUE 4 TIMES DAILY
Status: DISCONTINUED | OUTPATIENT
Start: 2020-02-11 | End: 2020-02-11

## 2020-02-11 RX ORDER — SODIUM CHLORIDE 0.9 % (FLUSH) 0.9 %
10 SYRINGE (ML) INJECTION EVERY 12 HOURS SCHEDULED
Status: DISCONTINUED | OUTPATIENT
Start: 2020-02-11 | End: 2020-02-13 | Stop reason: HOSPADM

## 2020-02-11 RX ORDER — PANTOPRAZOLE SODIUM 40 MG/1
40 TABLET, DELAYED RELEASE ORAL
Status: DISCONTINUED | OUTPATIENT
Start: 2020-02-11 | End: 2020-02-11

## 2020-02-11 RX ORDER — SODIUM CHLORIDE 0.9 % (FLUSH) 0.9 %
10 SYRINGE (ML) INJECTION PRN
Status: DISCONTINUED | OUTPATIENT
Start: 2020-02-11 | End: 2020-02-13 | Stop reason: HOSPADM

## 2020-02-11 RX ORDER — FERROUS SULFATE 325(65) MG
325 TABLET ORAL
Status: DISCONTINUED | OUTPATIENT
Start: 2020-02-11 | End: 2020-02-13 | Stop reason: HOSPADM

## 2020-02-11 RX ORDER — ACETAMINOPHEN 325 MG/1
650 TABLET ORAL EVERY 4 HOURS PRN
Status: DISCONTINUED | OUTPATIENT
Start: 2020-02-11 | End: 2020-02-13 | Stop reason: HOSPADM

## 2020-02-11 RX ORDER — ATORVASTATIN CALCIUM 80 MG/1
80 TABLET, FILM COATED ORAL NIGHTLY
Status: DISCONTINUED | OUTPATIENT
Start: 2020-02-11 | End: 2020-02-13 | Stop reason: HOSPADM

## 2020-02-11 RX ORDER — LEVETIRACETAM 500 MG/1
500 TABLET ORAL 2 TIMES DAILY
Status: DISCONTINUED | OUTPATIENT
Start: 2020-02-11 | End: 2020-02-11

## 2020-02-11 RX ORDER — ONDANSETRON 2 MG/ML
4 INJECTION INTRAMUSCULAR; INTRAVENOUS EVERY 6 HOURS PRN
Status: DISCONTINUED | OUTPATIENT
Start: 2020-02-11 | End: 2020-02-13 | Stop reason: HOSPADM

## 2020-02-11 RX ORDER — PANTOPRAZOLE SODIUM 40 MG/10ML
40 INJECTION, POWDER, LYOPHILIZED, FOR SOLUTION INTRAVENOUS DAILY
Status: DISCONTINUED | OUTPATIENT
Start: 2020-02-11 | End: 2020-02-12

## 2020-02-11 RX ADMIN — DEXAMETHASONE SODIUM PHOSPHATE 10 MG: 4 INJECTION, SOLUTION INTRAMUSCULAR; INTRAVENOUS at 12:02

## 2020-02-11 RX ADMIN — DEXAMETHASONE SODIUM PHOSPHATE 4 MG: 4 INJECTION, SOLUTION INTRAMUSCULAR; INTRAVENOUS at 23:19

## 2020-02-11 RX ADMIN — DEXAMETHASONE SODIUM PHOSPHATE 10 MG: 4 INJECTION, SOLUTION INTRAMUSCULAR; INTRAVENOUS at 05:11

## 2020-02-11 RX ADMIN — DEXAMETHASONE SODIUM PHOSPHATE 4 MG: 4 INJECTION, SOLUTION INTRAMUSCULAR; INTRAVENOUS at 16:56

## 2020-02-11 RX ADMIN — Medication 10 ML: at 21:24

## 2020-02-11 RX ADMIN — LEVETIRACETAM 500 MG: 100 INJECTION, SOLUTION INTRAVENOUS at 06:05

## 2020-02-11 RX ADMIN — ATORVASTATIN CALCIUM 80 MG: 80 TABLET, FILM COATED ORAL at 21:23

## 2020-02-11 RX ADMIN — PANTOPRAZOLE SODIUM 40 MG: 40 INJECTION, POWDER, FOR SOLUTION INTRAVENOUS at 08:25

## 2020-02-11 RX ADMIN — LEVETIRACETAM 500 MG: 100 INJECTION, SOLUTION INTRAVENOUS at 16:56

## 2020-02-11 RX ADMIN — Medication 10 ML: at 08:25

## 2020-02-11 ASSESSMENT — ENCOUNTER SYMPTOMS
CHOKING: 0
ABDOMINAL DISTENTION: 0
TROUBLE SWALLOWING: 0
VOICE CHANGE: 1
CHEST TIGHTNESS: 0
APNEA: 0
DIARRHEA: 0
STRIDOR: 0
NAUSEA: 1
SHORTNESS OF BREATH: 0
PHOTOPHOBIA: 0
WHEEZING: 0
VOMITING: 0
CONSTIPATION: 0
ABDOMINAL PAIN: 0
COUGH: 1

## 2020-02-11 ASSESSMENT — PAIN SCALES - GENERAL
PAINLEVEL_OUTOF10: 0
PAINLEVEL_OUTOF10: 0

## 2020-02-11 NOTE — PROGRESS NOTES
Occupational Therapy   Occupational Therapy Initial Assessment/Treatment  Date: 2020   Patient Name: Oswaldo Jonas  MRN: [id number]     : 1960    Date of Service: 2020    Discharge Recommendations:  Oswaldo Jonas scored a 18/24 on the AM-PAC ADL Inpatient form. Current research shows that an AM-PAC score of 18 or greater is typically associated with a discharge to the patient's home setting. Based on the patients AM-PAC score and their current ADL deficits, it is recommended that the patient have 2-3 sessions per week of Occupational Therapy at d/c to increase the patients independence. HOME HEALTH CARE: LEVEL 3 SAFETY     - Initial home health evaluation to occur within 24-48 hours, in patient home   - Therapy evaluations in home within 24-48 hours of discharge; including DME and home safety   - Frontload therapy 5 days, then 3x a week   - Therapy to evaluate if patient has [address] needs for personal care   -  evaluation within 24-48 hours, includes evaluation of resources and insurance to determine [place], [place], LTC, and Medicaid options        OT Equipment Recommendations  Equipment Needed: Yes  Other: TTB vs shower chair- continue to discuss    Assessment   Performance deficits / Impairments: Decreased functional mobility ; Decreased ADL status; Decreased vision/visual deficit; Decreased cognition;Decreased high-level IADLs  Assessment: Pt is typically independent with ADLs and IADLs at baseline. She has had a steady decline in mobility and ADLs over past 2 weeks. Pt most limited by impaired L peripheral vision with decreased awareness, she requires VC and physical assist with mobility and ADLs due to vision deficits. Also demonstrates decreased problem solving during ADLs.  Recommend ongoing OT tx upon d/c. Pt's family plans to provide 24hr assist and discharge and are receptive to home therapies to maximize pt's safety and function  Treatment Diagnosis: Impaired ADLs and mobility related to vision and cognitive deficits  Prognosis: Fair  Decision Making: Medium Complexity  OT Education: OT Role;Plan of Care;Precautions; Family Education; ADL Adaptive Strategies;Transfer Training; Low Vision Education  Patient Education: educated pt and family regarding pt's current vision deficits and adaptive strategies during mobility/ADLs  REQUIRES OT FOLLOW UP: Yes  Activity Tolerance  Activity Tolerance: Patient Tolerated treatment well  Safety Devices  Safety Devices in place: Yes  Type of devices: Nurse notified; Chair alarm in place;Call light within reach(family present)           Treatment Diagnosis: Impaired ADLs and mobility related to vision and cognitive deficits      Restrictions  Position Activity Restriction  Other position/activity restrictions: up with assist, seizure precautions    Subjective   General  Chart Reviewed: Yes  Additional Pertinent Hx: Admit 2/11 from [facility] with new findings of lung CA and brain mets; neurosurgery following; PMhx: colon CA, breast CA, arachnoiditis, L mastectomy, back surgery  Family / Caregiver Present: Yes(daughter, sister, friend present)  Referring Practitioner: Duong Bell MD  Diagnosis: brain metastases  Subjective  Subjective: Pt in bed upon entry, agreeable to OT evaluation.  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  Additional Comments: pt has been Are Fluid And Coordinated: Yes     Bed mobility  Supine to Sit: Supervision(HOB elevated)     Transfers  Sit to stand: Stand by assistance(eob)  Stand to sit: Stand by assistance(chair)  Transfer Comments: VC for safe hand placement during all transfers using 4ww, decreased carryover of learning     Cognition  Overall Cognitive Status: Exceptions  Memory: Decreased recall of recent events  Problem Solving: Assistance required to implement solutions;Assistance required to correct errors made  Insights: Decreased awareness of deficits        Sensation  Overall Sensation Status: WFL        LUE AROM (degrees)  LUE AROM : WNL  RUE AROM (degrees)  RUE AROM : WNL  LUE Strength  Gross LUE Strength: WNL(5/5)  L Hand General: 5/5  RUE Strength  Gross RUE Strength: WNL(5/5)  R Hand General: 5/5     Hand Dominance  Hand Dominance: Right         Treatment consisted of:  ADLs, transfer training, education on activity promotion and fall precautions.       Plan   Plan  Times per week: 5-7  Times per day: Daily  Current Treatment Recommendations: Functional Mobility Training, Endurance Training, Equipment Evaluation, Education, & procurement, Patient/Caregiver Education & Training, Self-Care / ADL, Safety Education & Training           AM-PAC Score  -[facility] Inpatient Daily Activity Raw Score: 18 (02/11/20 1337)  -PAC Inpatient ADL T-Scale Score : 38.66 (02/11/20 1337)  ADL Inpatient CMS 0-100% Score: 46.65 (02/11/20 1337)  ADL Inpatient CMS G-Code Modifier : CK (02/11/20 1337)    Goals  Short term goals  Time Frame for Short term goals: by discharge  Short term goal 1: sba dressing with <3 VC for problem solving task  Short term goal 2: Pt attend to ADL/environmental items on L 50% of time without VC  Short term goal 3: sba for bathroom mobility using LRAD       Therapy Time   Individual Concurrent Group Co-treatment   Time In 0915         Time Out 0956         Minutes 41         Timed Code Treatment Minutes:   26  Total Treatment

## 2020-02-11 NOTE — DISCHARGE SUMMARY
Hospital Medicine Discharge Summary    Patient ID: Jayna Torres      Patient's PCP: Mark Lynn MD    Admit Date: 2/9/2020     Discharge Date: 2/11/2020    Admitting Physician: Shanell Valle MD     Discharge Physician: Madonna Castro MD     Discharge Diagnoses: Active Hospital Problems    Diagnosis    Mild malnutrition (HCC) [E44.1]    Cavitating mass in left upper lung lobe [J98.4]    Pulmonary metastases (HCC) [C78.00]    Hoarseness of voice [R49.0]    Former smoker [Z87.891]    Pleural effusion on left [J90]    Generalized weakness [R53.1]    Malignant neoplasm of left female breast ([address] [C50.912]       The patient was seen and examined on day of discharge and this discharge summary is in conjunction with any daily progress note from day of discharge. History Of Present Illness:      60 y.o. female with PMH of breast ca, OA who presents with generalized weakness and fatigue. Has been going for 3-4 weeks. Seen per PCP and found to be anemic and was started on iron replacement. Associated with intermittent episodes of dyspnea. Symptoms worsened with exertion. Also with one episode of dark diarrhea. Denies chest pain, fever, chills, abdominal pain, gross bleeding, calf pain or erythema. CT imaging in ED found remarkable for large mass with evidence of mets.         Hospital Course:     New 13cm CLAYTON lung mass consistent with metastatic bronchiogenic carcinoma.   -.   Status post bronchoscopy-  Prelim Positive for non-small cell lung cancer.     -Oncology consulted.   MRi brain showed intracranial mets with edea/mass effect.   -sent to [facility] hospital per neuro-surgery eval.      History of breast cancer s/p mastectomy.        Acute vs chronic anemia.  Last Hgb on file from 2011 which was normal.  No gross foci of bleeding  Dark diarrhea episode x2.  Suspect 2/2 iron replacement  - recently started on iron replacement therapy as outpt  - FOBT neg in ED  - started on PPi     Generalized weaknesss 2/2 above          Physical Exam Performed:     /69   Pulse 77   Temp 98.9 °F (37.2 °C) (Oral)   Resp 18   Ht 5' 6\" (1.676 m)   Wt 119 lb (54 kg)   LMP 06/01/2011   SpO2 95%   BMI 19.21 kg/m²       General appearance:  No apparent distress, appears stated age and cooperative. HEENT:  Normal cephalic, atraumatic without obvious deformity. Pupils equal, round, and reactive to light. Extra ocular muscles intact. Conjunctivae/corneas clear. Neck: Supple, with full range of motion. No jugular venous distention. Trachea midline. Respiratory:  Normal respiratory effort. Clear to auscultation, bilaterally without Rales/Wheezes/Rhonchi. Cardiovascular:  Regular rate and rhythm with normal S1/S2 without murmurs, rubs or gallops. Abdomen: Soft, non-tender, non-distended with normal bowel sounds. Musculoskeletal:  No clubbing, cyanosis or edema bilaterally. Full range of motion without deformity. Skin: Skin color, texture, turgor normal.  No rashes or lesions. Neurologic:  Neurovascularly intact without any focal sensory/motor deficits. Cranial nerves: II-XII intact, grossly non-focal.  Psychiatric:  Alert and oriented, thought content appropriate, normal insight  Capillary Refill: Brisk,< 3 seconds   Peripheral Pulses: +2 palpable, equal bilaterally       Labs:  For convenience and continuity at follow-up the following most recent labs are provided:      CBC:    Lab Results   Component Value Date    WBC 5.3 02/11/2020    HGB 9.4 02/11/2020    HCT 29.5 02/11/2020     02/11/2020       Renal:    Lab Results   Component Value Date     02/11/2020    K 4.9 02/11/2020    K 4.4 02/10/2020     02/11/2020    CO2 22 02/11/2020    BUN 9 02/11/2020    CREATININE <0.5 02/11/2020    CALCIUM 9.1 02/11/2020         Significant Diagnostic Studies    Radiology:   MRI BRAIN W WO CONTRAST   Final Result   Metastases within the supratentorial and infratentorial brain measuring up to   2.5 cm greatest dimension. This is associated with mass effect and midline   shift measuring up to 9 mm. Critical results were called by Dr. Maldonado Centers to Dr Destiny Muñoz on 2/10/2020 at   20:47. CT ABDOMEN PELVIS W IV CONTRAST Additional Contrast? None   Final Result   1. Negative for pulmonary embolus. 2. Infiltrating 13 cm left upper lobe bronchogenic carcinoma with extensive   pulmonary metastatic disease. 3. Negative for abdominopelvic metastatic disease. CT CHEST PULMONARY EMBOLISM W CONTRAST   Final Result   1. Negative for pulmonary embolus. 2. Infiltrating 13 cm left upper lobe bronchogenic carcinoma with extensive   pulmonary metastatic disease. 3. Negative for abdominopelvic metastatic disease. XR CHEST PORTABLE   Final Result   Large consolidated opacity left apex. This is indeterminate and may   represent pneumonia, however underlying mass lesion is not excluded. CT   chest with contrast recommended      Numerous lung nodules suggesting diffuse metastatic disease. Consults:     IP CONSULT TO HOSPITALIST  IP CONSULT TO PULMONOLOGY  IP CONSULT TO ONCOLOGY    Disposition:  [facility]      Condition at Discharge: Stable    Discharge Instructions/Follow-up:      Code Status:  Full Code     Activity: activity as tolerated    Diet: regular diet      Discharge Medications:     Discharge Medication List as of 2/11/2020  1:06 AM           Details   amoxicillin (AMOXIL) 250 MG capsule Take 250 mg by mouth 3 times dailyHistorical Med      ferrous sulfate 325 (65 Fe) MG tablet Take 325 mg by mouth daily (with breakfast)Historical Med             Time Spent on discharge is more than 30 minutes in the examination, evaluation, counseling and review of medications and discharge plan. Signed:    Therese Houston MD   2/11/2020      Thank you Colin Chao MD for the opportunity to be involved in this patient's care.  If you have any questions or concerns please feel free to contact me at [phone].

## 2020-02-11 NOTE — FLOWSHEET NOTE
02/11/20 1325   Encounter Summary   Services provided to: Patient   Referral/Consult From: Rounding   Continue Visiting   (Seen 2/11/2020, ARABELLA. )   Complexity of Encounter Moderate   Length of Encounter 15 minutes   Routine   Type Initial   Assessment Approachable   Intervention Nurtured hope   Outcome Expressed gratitude

## 2020-02-11 NOTE — PROCEDURES
Bronchoscopy note    Patient admitted to the hospital with increased weakness along with that patient had hoarseness of voice and was found to have large left upper lobe lung mass along with that patient had hilar adenopathy and pulmonary metastasis and given the patient's clinical status and radiology it was decided to do a bronchoscopy with endobronchial ultrasound and needle biopsy and for that reason after informed consent, the patient was taken to the endoscopy suite, patient was given IV sedation by the anesthesia team, please refer to anesthesia sheet for ASA and Mallampati scores, the bronchoscope was introduced to the Igel tube; patient's trachea was normal, patient's right tracheobronchial tree was normal, patient's main anay was sharp, patient had endobronchial mass in the left upper lobe macroscopically and BAL along with bronchial brushings were done along with that needle biopsies from the left upper lobe mass under ultrasound along with needle biopsy of the right hilar node was done, preliminary results as per the pathologist shows that patient has non-small cell malignancy, which needs to be further evaluated in terms of whether it is primary lung or coming from breast or colon depending on the cell block; patient's bronchoscopy findings were discussed with patient's family, patient did not have any apparent complications  Estimated blood loss was less than 10 mL  Further management depending on patient's clinical status and the final bronchoscopy results    River Antonio MD

## 2020-02-11 NOTE — CONSULTS
Oncology Hematology Care  Consult Note      Requesting Physician: Maryan Sanchez MD    CHIEF COMPLAINT:  Fatigue    HISTORY OF PRESENT ILLNESS:  Ms. Ap Coreas  is a 60 y.o. female with known history of breast cancer (2017) and colon cancer (2008) we are seeing in consultation for symptoms and new findings on imaging concerning for metastatic disease. The patient presented initially to [facility] ED on 2/9/2020 with worsening weakness, fatigue, and hoarseness of voice, progressive over the past 4 weeks. She also endorses a 15-25 pound weight loss, intermittent headache, nausea, weakness in her legs, and symptoms of dysmetria. Given her oncological history, patient had pan CT scans and MRI of the lalo showing a new 13 cm mass of the left upper lobe with extensive metastatic disease in the lung, no mets to the abdomen and pelvis. MRI brain shows several large brain masses with edema and mass effect and a midline shift up to 9mm. At [facility] she was evaluated by pulmonary and oncology. She underwent bronchoscopy on 2/9/2020 and has cytology pending. Due to the brain mass findings, she was then transferred to [facility] on early 2/11/2020 for neurosurgery evaluation. Upon transfer here, patient had a CT head confirming 6 mm right-to-left midline shift. Neurosurgery was consulted. Patient is currently on Decadron and Keppra. Her past history is significant for multiple primary cancers in the past. In 2008, she was diagnosed with colon cancer and had a colectomy, no adjuvant therapy was indicated. 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 Neoadjuvant chemotherapy was recommended but patient declined. She was then lost to follow up with oncology. Recently she was seen by her PCP with a concern for possible iron deficiency anemia. She is a former smoker, with about 10-15 pack year history and reports quit smoking last October 2019.  She lives at home with her Cardiovascular: Negative for chest pain, palpitations and leg swelling. Gastrointestinal: Positive for nausea. Negative for abdominal distention, abdominal pain, constipation, diarrhea and vomiting. Genitourinary: Negative for difficulty urinating and dysuria. Skin: Negative for rash and wound. Neurological: Positive for weakness and headaches. Negative for dizziness, seizures and light-headedness. PHYSICAL EXAM:      Vitals:  /72   Pulse 70   Temp 98.1 °F (36.7 °C) (Oral)   Resp 20   Ht 5' 6\" (1.676 m)   Wt 131 lb 9.8 oz (59.7 kg)   LMP 06/01/2011   SpO2 95%   BMI 21.24 kg/m²       Intake/Output Summary (Last 24 hours) at 2/11/2020 0858  Last data filed at 2/11/2020 0630  Gross per 24 hour   Intake 151 ml   Output 1050 ml   Net -899 ml       Physical Exam  Vitals signs and nursing note reviewed. Constitutional:       General: She is not in acute distress. Appearance: She is well-developed. She is not toxic-appearing or diaphoretic. Comments: Frail appearing. HENT:      Head: Normocephalic and atraumatic. Eyes:      General: No scleral icterus. Extraocular Movements: Extraocular movements intact. Conjunctiva/sclera: Conjunctivae normal.      Pupils: Pupils are equal, round, and reactive to light. Neck:      Musculoskeletal: Normal range of motion and neck supple. Cardiovascular:      Rate and Rhythm: Normal rate and regular rhythm. Pulses: Normal pulses. Heart sounds: Normal heart sounds. No murmur. No friction rub. No gallop. Pulmonary:      Effort: Pulmonary effort is normal. No respiratory distress. Breath sounds: Normal breath sounds. No wheezing or rales. Chest:      Chest wall: No tenderness. Abdominal:      General: Bowel sounds are normal. There is no distension. Palpations: Abdomen is soft. Tenderness: There is no abdominal tenderness. There is no guarding. Musculoskeletal: Normal range of motion.          General: the patient is status post left mastectomy and axillary dissection. 1. Negative for pulmonary embolus. 2. Infiltrating 13 cm left upper lobe bronchogenic carcinoma with extensive pulmonary metastatic disease. 3. Negative for abdominopelvic metastatic disease. Xr Chest Portable    Result Date: 2/9/2020  EXAMINATION: ONE XRAY VIEW OF THE CHEST 2/9/2020 11:23 am COMPARISON: None. HISTORY: ORDERING SYSTEM PROVIDED HISTORY: fatigue/anemia TECHNOLOGIST PROVIDED HISTORY: Reason for exam:->fatigue/anemia Reason for Exam: fatigue/anemia Acuity: Acute Type of Exam: Initial Relevant Medical/Surgical History: breast cancer (left breast) FINDINGS: Heart size is normal.  There is a large area opacification in the left apex. Numerous nodules are noted throughout both lungs. There is also fullness in the right hilar region suggesting hilar adenopathy. Multiple surgical clips project in the left chest wall and over the left hemithorax. Large consolidated opacity left apex. This is indeterminate and may represent pneumonia, however underlying mass lesion is not excluded. CT chest with contrast recommended Numerous lung nodules suggesting diffuse metastatic disease. Ct Chest Pulmonary Embolism W Contrast    Result Date: 2/9/2020  EXAMINATION: CTA OF THE CHEST; CT OF THE ABDOMEN AND PELVIS WITH CONTRAST 2/9/2020 12:33 pm; 2/9/2020 12:39 pm TECHNIQUE: CTA of the chest was performed after the administration of intravenous contrast.  Multiplanar reformatted images are provided for review. MIP images are provided for review. Dose modulation, iterative reconstruction, and/or weight based adjustment of the mA/kV was utilized to reduce the radiation dose to as low as reasonably achievable.; CT of the abdomen and pelvis was performed with the administration of intravenous contrast. Multiplanar reformatted images are provided for review.  Dose modulation, iterative reconstruction, and/or weight based adjustment of the mA/kV posterior fossa, the largest within the left median cerebral hemisphere 1.9 x 1.6 cm in size. Two ring-enhancing lesions identified within the right cerebral hemisphere 4 mm and 4 mm respectively. There is partial effacement of the 4th ventricle. There is radiopaque effacement of the occipital horn right lateral ventricle. Partial effacement of the left lateral ventricle noted. There is posterior indentation of the darrell and midbrain due to the vasogenic edema and the right-sided metastases. The sellar/suprasellar regions appear unremarkable. The normal signal voids within the major intracranial vessels appear maintained. ORBITS: The visualized portion of the orbits demonstrate no acute abnormality. SINUSES: Mild ethmoid sinus mucosal thickening. BONES/SOFT TISSUES: The bone marrow signal intensity appears normal. The soft tissues demonstrate no acute abnormality. Metastases within the supratentorial and infratentorial brain measuring up to 2.5 cm greatest dimension. This is associated with mass effect and midline shift measuring up to 9 mm. Critical results were called by Dr. Tawanna Ortega to Dr Sonu Saenz on 2/10/2020 at 20:47. Problem List  Problem List Items Addressed This Visit     None            IMPRESSION/RECOMMENDATIONS:  60year old female with prior history of colon adenocarcinoma s/p resection and triple negative grade 3 IDC s/p mastectomy of the left breast who was admitted for new findings of metastatic disease involving the lung and brain.     - The patient will need a biopsy to establish a definitive diagnosis. If cytology of the bronchoscopy performed on 2/9/2020 does not yield specific findings, she will need a tissue biopsy, most likely a percutaneous core needle biopsy of the large mass in left upper lobe of the lung. Depending on findings, additional molecular testing may be necessary, including NexGen sequencing. Treatment plans and therapy will depend on the pathology findings.    -

## 2020-02-11 NOTE — PROGRESS NOTES
Notified of critical results of brain MRI. Metastases within supratentorial and infratentorial brain measuring up to 2.5 cm greatest dimension. This is associated with mass effect and midline shift measuring up to 9 mm. Spoke with Dr. Rodo Best, neurgosurgeon on call for [facility] regarding possible transfer. Neurosurgery in agreement with transfer to [facility] and requests admission via hospitalist service. Spoke with hospitalist, Dr Jessica Szymanski who is in agreement with transfer. Dexamethasone and Keppra initiated per neurosurgery request. Patient is currently asymptomatic. VSS. She is in agreement with transfer.      Alessandro Gates PA-C  11:01 PM

## 2020-02-11 NOTE — PLAN OF CARE
Problem: Falls - Risk of:  Goal: Will remain free from falls  Description  Will remain free from falls  2/10/2020 2109 by Radha Leary RN  Outcome: Ongoing  2/10/2020 0748 by Earline Grajeda RN  Outcome: Ongoing  Goal: Absence of physical injury  Description  Absence of physical injury  2/10/2020 2109 by Radha Leary RN  Outcome: Ongoing  2/10/2020 0748 by Earline Grajeda RN  Outcome: Ongoing     Problem: Nutrition  Goal: Optimal nutrition therapy  2/10/2020 2109 by Radha Leary RN  Outcome: Ongoing  2/10/2020 1425 by Fran Murray RD, LD  Outcome: Ongoing

## 2020-02-11 NOTE — H&P
MD at [facility] N/A 2/10/2020    BRONCHOSCOPY ENDOBRONCHIAL ULTRASOUND performed by Fidencio Herrera MD at [address] Left 10/04/2017    LEFT BREAST TOTAL MASTECTOMY WITH SENTINEL LYMPH NODE BIOPSY       SocialHistory:   The patient lives at home    Alcohol:  Illicit drugs: no use  Tobacco:      Family History:  Family History   Problem Relation Age of Onset    Arthritis Other     Cancer Other     Diabetes Other     Heart Disease Other     High Blood Pressure Other        MEDICATIONS:     No current facility-administered medications on file prior to encounter.       Current Outpatient Medications on File Prior to Encounter   Medication Sig Dispense Refill    amoxicillin (AMOXIL) 250 MG capsule Take 250 mg by mouth 3 times daily      ferrous sulfate 325 (65 Fe) MG tablet Take 325 mg by mouth daily (with breakfast)           Scheduled Meds:   dexamethasone  10 mg Intramuscular 4x Daily    pantoprazole  40 mg Oral QAM AC    ferrous sulfate  325 mg Oral Daily with breakfast    famotidine  20 mg Oral BID    sodium chloride flush  10 mL Intravenous 2 times per day    levetiracetam  1,000 mg Intravenous Once    Followed by   Rubi Donell levetiracetam  500 mg Intravenous Q12H    atorvastatin  80 mg Oral Nightly      Continuous Infusions:  PRN Meds:sodium chloride flush, acetaminophen, ondansetron    Allergies: No Known Allergies    REVIEW OF SYSTEMS:       History obtained from the patient    Review of Systems  See above    PHYSICAL EXAM:       Vitals: /84   Pulse 69   Temp 97.9 °F (36.6 °C) (Oral)   Resp 18   Ht 5' 6\" (1.676 m)   Wt 131 lb 9.8 oz (59.7 kg)   LMP 06/01/2011   SpO2 95%   BMI 21.24 kg/m²     I/O:      Intake/Output Summary (Last 24 hours) at 2/11/2020 0423  Last data filed at 2/11/2020 0200  Gross per 24 hour   Intake --   Output 550 ml   Net -550 ml     I/O this shift:  In: - 1.22*       U/A:  Recent Labs     02/10/20  0146   COLORU YELLOW   PHUR 6.5   WBCUA 9*   RBCUA 5*   CLARITYU CLOUDY*   SPECGRAV >1.030   LEUKOCYTESUR SMALL*   UROBILINOGEN 1.0   BILIRUBINUR Negative   BLOODU Negative   GLUCOSEU Negative       CT head without contrast    (Results Pending)       EKG:   Echo:  Micro:     ASSESSMENT AND PLAN:   Bronchogenic carcinoma of the left lung   new 13cm left upper lobe mass seen on CT abdomen/pelvis. Bronchoscopy on 2/9 preliminary positive for non-small cell lung cancer. -- Supplemental oxygen as needed. -- Oncology consultation and recommendations appreciated. -- Pulmonology consultation and recommendations appreciated. -- follow up biopsy results    Brain metastases   brain MRI showed metastases within supratentorial and infratentorial brain measuring up to 2.5 cm in dimension, associated with mass effect and midline shift up to 9 mm. -- Q2h neuro checks. -- Decadron 10 q6h. -- Keppra 500 BID. -- CT head AM  -- Height of the bed 30 degrees. -- Protonix 40 PO  -- Telemetry  -- continue lipitor 80 mg  -- follow up nsx recs  -- follow up hem/onc recs    Microcytic anemia   H/H 9.9/31.8, stable. Iron and TIBC low. Recently started iron replacement as an outpatient. FOBT negative. Episode of dark diarrhea likely 2/2 iron replacement therapy. -- CBC daily; transfuse if <7   -- Continue protonix 40 mg PO.  -- Continue ferrous sulfate 325 mg daily.        History of ductal carcinoma in situ   s/p left mastectomy in 2017     History of colon cancer   s/p bowel resection in 2008      Code Status:Full Code  FEN: NPO  PPX:  SCD  DISPO:  ICU    This patient has been staffed and discussed with Jeremiah Cuevas MD.   -----------------------------  Raz Christian MD, PGY-1  2/11/2020  4:23 AM

## 2020-02-11 NOTE — CONSULTS
Department of Radiation Oncology  Attending Consult Note      Reason for Consult:  Suspected lung mets  Requesting Physician:  Dr Alexandra Montana:  RODRIGUEZ, Wt loss    History Obtained From:  patient, family member - daughter and sisters, electronic medical record    HISTORY OF PRESENT ILLNESS:      Ms. Olivier Meyers is a 48-year-old female with a history of colon cancer resected in 2007 and breast cancer status post mastectomy 2017. She presented to the ER with increasing shortness of breath, headache, gait instability, and weight loss. She underwent imaging the head showed multiple lesions suspicious for brain metastases. Her imaging of the chest, abdomen, and pelvis shows a large left lower lobe mass with multiple, bilateral, pulmonary nodules suspicious for metastases. She was admitted to [facility] and transferred to [facility] for neuro management. She has been started on dexamethasone and her gait has improved. She underwent a bronchoscopy in [place] and pathology is pending. She does have a history of tobacco use but quit in October. Radiation oncology is consulted consider role radiation therapy and what is presumed to be metastatic non-small cell lung cancer.     Cancer Treatment History:  Partial colectomy, mastectomy    Past Medical History:        Diagnosis Date    Arachnoiditis     Arthritis     Cancer ([facility] [address]     colon, PRIMARY BREAST     Past Surgical History:        Procedure Laterality Date    BACK SURGERY      BRONCHOSCOPY N/A 2/10/2020    BRONCHOSCOPY W/EBUS FNA performed by Guzman Lerma MD at [address] N/A 2/10/2020    BRONCHOSCOPY BRUSHINGS performed by Guzman Lerma MD at [address] N/A 2/10/2020    BRONCHOSCOPY ALVEOLAR LAVAGE performed by Guzman Lerma MD at [address] N/PHUONG 2/10/2020    BRONCHOSCOPY ENDOBRONCHIAL ULTRASOUND performed by Guzman Lerma MD at [facility] SECTION      CHOLECYSTECTOMY      COLON SURGERY      MASTECTOMY Left 10/04/2017    LEFT BREAST TOTAL MASTECTOMY WITH SENTINEL LYMPH NODE BIOPSY       Current Medications:    Current Facility-Administered Medications: ferrous sulfate tablet 325 mg, 325 mg, Oral, Daily with breakfast  sodium chloride flush 0.9 % injection 10 mL, 10 mL, Intravenous, 2 times per day  sodium chloride flush 0.9 % injection 10 mL, 10 mL, Intravenous, PRN  acetaminophen (TYLENOL) tablet 650 mg, 650 mg, Oral, Q4H PRN  ondansetron (ZOFRAN) injection 4 mg, 4 mg, Intravenous, Q6H PRN  atorvastatin (LIPITOR) tablet 80 mg, 80 mg, Oral, Nightly  levETIRAcetam (KEPPRA) 500 mg in sodium chloride 0.9 % 100 mL IVPB, 500 mg, Intravenous, Q12H  pantoprazole (PROTONIX) injection 40 mg, 40 mg, Intravenous, Daily  dexamethasone (DECADRON) injection 4 mg, 4 mg, Intravenous, Q6H    Allergies:  Review of patient's allergies indicates no known allergies. Social History:    Social History     Socioeconomic History    Marital status:       Spouse name: Not on file    Number of children: 1    Years of education: Not on file    Highest education level: Not on file   Occupational History    Occupation:    Social Needs    Financial resource strain: Not on file    Food insecurity:     Worry: Not on file     Inability: Not on file   [employer] needs:     Medical: Not on file     Non-medical: Not on file   Tobacco Use    Smoking status: Former Smoker     Packs/day: 1.00     Types: Cigarettes     Last attempt to quit: 10/1/2019     Years since quittin.3    Smokeless tobacco: Never Used   Substance and Sexual Activity    Alcohol use: Yes     Comment: OCCASIONAL GLASS OF WINE    Drug use: No    Sexual activity: Not on file   Lifestyle    Physical activity:     Days per week: Not on file     Minutes per session: Not on file    Stress: Not on file   Relationships    Social connections:     Talks on phone: Not on file     Gets together: Not on file     Attends Mosque service: Not on file     Active member of club or organization: Not on file     Attends meetings of clubs or organizations: Not on file     Relationship status: Not on file    Intimate partner violence:     Fear of current or ex partner: Not on file     Emotionally abused: Not on file     Physically abused: Not on file     Forced sexual activity: Not on file   Other Topics Concern    Not on file   Social History Narrative    Not on file     Family History:       Problem Relation Age of Onset    Arthritis Other     Cancer Other     Diabetes Other     Heart Disease Other     High Blood Pressure Other      REVIEW OF SYSTEMS:    As outlined in HPI and chart review, otherwise review of general, eyes, nose, throat, pulmonary, cardiac, GI, , musculoskeletal, neurological, and integumentary systems is negative. PHYSICAL EXAM:      Vitals:    Vitals:    02/11/20 1400   BP: 101/69   Pulse: 77   Resp: 19   Temp:    SpO2:        General appearance: alert and cooperative  Head: Normocephalic, without obvious abnormality, atraumatic  Neck: Supple, No palpable lymphadenopathy in supraclavicular or cervical chains  Lungs: Breathing easily on O2 at 2 LPM by NC  Heart: Regular rate and rhythm  Abdomen: Benign  Extremities: without cyanosis, clubbing, edema or asymmetry  Skin: No jaundice, purpura or petechiae  Neuorological: CN II-XII grossly intact with no focal neurological deficits. DATA:    Ct Head Without Contrast    Result Date: 2/11/2020  Patient: REMA PADILLA  Time Out: 06:12 Exam(s): CT HEAD Without Contrast  EXAM:   CT Head Without Intravenous Contrast  CLINICAL HISTORY:    Reason for exam: brain metastases with midline shift. Reason for exam:- >brain metastases with midline shift.  Has a \"code stroke\" or \"stroke alert\" been called?->No.  TECHNIQUE:   Axial computed tomography images of the head/brain without intravenous contrast.  CTDI is 68 mGy and DLP is 1127 mGy-cm. All CT scans at this facility use dose modulation, iterative reconstruction, and/or weight based dosing when appropriate to reduce radiation dose to as low as reasonably achievable. COMPARISON:  MRI brain 2/10/2020  FINDINGS:  6 mm right-to-left midline shift. No metastasis in the right parietal lobe and left cerebellum with surrounding vasogenic edema. No hemorrhage. Electronically signed by Paddy Black MD on 02-11-20 at 0612     6 mm right-to-left midline shift. No metastasis in the right parietal lobe and left cerebellum with surrounding vasogenic edema. No hemorrhage. Ct Abdomen Pelvis W Iv Contrast Additional Contrast? None    Result Date: 2/9/2020  EXAMINATION: CTA OF THE CHEST; CT OF THE ABDOMEN AND PELVIS WITH CONTRAST 2/9/2020 12:33 pm; 2/9/2020 12:39 pm TECHNIQUE: CTA of the chest was performed after the administration of intravenous contrast.  Multiplanar reformatted images are provided for review. MIP images are provided for review. Dose modulation, iterative reconstruction, and/or weight based adjustment of the mA/kV was utilized to reduce the radiation dose to as low as reasonably achievable.; CT of the abdomen and pelvis was performed with the administration of intravenous contrast. Multiplanar reformatted images are provided for review. Dose modulation, iterative reconstruction, and/or weight based adjustment of the mA/kV was utilized to reduce the radiation dose to as low as reasonably achievable. COMPARISON: None. HISTORY: ORDERING SYSTEM PROVIDED HISTORY: r/o PE TECHNOLOGIST PROVIDED HISTORY: Reason for exam:->r/o PE Reason for Exam: r/o PE Acuity: Unknown Type of Exam: Unknown; ORDERING SYSTEM PROVIDED HISTORY: Crampy abdominal pain with diarrhea rule out colitis versus diverticulitis TECHNOLOGIST PROVIDED HISTORY: If patient is on cardiac monitor and/or pulse ox, they may be taken off cardiac monitor and pulse ox, left on O2 if currently on.  All monitors reattached when patient returns to room. Additional Contrast?->None Reason for exam:->Crampy abdominal pain with diarrhea rule out colitis versus diverticulitis Reason for Exam: crampy abd pain Acuity: Unknown Type of Exam: Unknown FINDINGS: Pulmonary Arteries: Pulmonary arteries are adequately opacified for evaluation. No evidence of intraluminal filling defect to suggest pulmonary embolism. Main pulmonary artery is normal in caliber. Mediastinum: There is a large infiltrating soft tissue mass within the left upper lobe which extends into the left hilum resulting in encasement of the left mainstem bronchus and left main pulmonary artery. Soft tissue extends into the AP window and anterior mediastinum. The overall dimensions of the mass are 13 x 9.3 cm. There is bulky adenopathy within the right hilum and subcarinal space measuring 3.6 cm and 2.3 cm respectively. Lungs/pleura: Innumerable round soft tissue masses are scattered throughout each lung. Dominant mass within the left upper lobe again measures 13 cm with the majority of remaining nodules varying between 1 and 3 cm in diameter. There is a small left pleural effusion. Organs: Numerous small simple cysts are scattered throughout the liver. Postsurgical changes of cholecystectomy are present. The remainder of the solid abdominal organs are unremarkable. Bowel: There is no bowel dilatation, wall thickening or obstruction. Pelvis: The pelvic organs and urinary bladder are grossly unremarkable. Peritoneum/retroperitoneum: There is no free air, free fluid or intraperitoneal inflammatory change. There is no adenopathy. Soft Tissues/Bones: There is no fracture or aggressive osseous lesion. Note is made the patient is status post left mastectomy and axillary dissection. 1. Negative for pulmonary embolus. 2. Infiltrating 13 cm left upper lobe bronchogenic carcinoma with extensive pulmonary metastatic disease. 3. Negative for abdominopelvic metastatic disease. osseous lesion. Note is made the patient is status post left mastectomy and axillary dissection. 1. Negative for pulmonary embolus. 2. Infiltrating 13 cm left upper lobe bronchogenic carcinoma with extensive pulmonary metastatic disease. 3. Negative for abdominopelvic metastatic disease. Mri Brain W Wo Contrast    Result Date: 2/10/2020  EXAMINATION: MRI OF THE BRAIN WITHOUT AND WITH CONTRAST  2/10/2020 7:32 pm TECHNIQUE: Multiplanar multisequence MRI of the head/brain was performed without and with the administration of intravenous contrast. COMPARISON: None. HISTORY: ORDERING SYSTEM PROVIDED HISTORY: Lung cancer, headache TECHNOLOGIST PROVIDED HISTORY: Reason for exam:->Lung cancer, headache Reason for Exam: New lung cancer diagnosis. History of breast cancer 2017, mastectomy, no chemo/radiation. 2008 colon cancer, bowel resection. headaches, weakness. Multihance 10ml.  gfr>60 FINDINGS: INTRACRANIAL STRUCTURES/VENTRICLES:  There is no acute infarct. Generalized involutional change brain parenchyma identified with prominent of the ventricles and sulci sulci. There is mild sulcal effacement identified involving right cerebral hemisphere and the cerebellar hemispheres. This causes right-to-left midline shift measuring 9 mm. Evidence of multiple intracranial metastases identified with surrounding vasogenic edema. Large metastasis within the right parieto-occipital lobe measures 2.5 x 2.0 cm in size with central areas of thick internal septations and central hypoenhanced suggestive of necrosis. Additional metastasis identified involving the right parasagittal posterior frontal lobe 0.5 x 3.5 cm. Multiple intensities identified with the posterior fossa, the largest within the left median cerebral hemisphere 1.9 x 1.6 cm in size. Two ring-enhancing lesions identified within the right cerebral hemisphere 4 mm and 4 mm respectively. There is partial effacement of the 4th ventricle.   There is radiopaque the chest as well as she does have extensive hilar disease it could lead to airway obstruction. Overall, she appeared to understand her dire situation and was willing to proceed with whole brain radiation therapy. We will bring her down to the department today for simulation and anticipate starting treatment either later this afternoon or tomorrow. Anticipate 2000 cGy in 5 fractions. Thank you for inviting me to participate in Mrs Moya's care. Gege Velez MD  [facility] Radiation Oncology  [phone]    ECOG Performance Status (ECOG Scale 0-4): Score 3:  Patient is capable of only limited self-care, confined to bed or chair greater than 50% of waking hours.

## 2020-02-11 NOTE — CONSULTS
Leni Cruz [facility]  Palliative Medicine Consultation Note      Date Of Admission:2/11/2020  Date of consult: 02/11/20  Seen by [facility] [place] in the past:  No    Recommendations:        Met with Ms Deysi Moise, introduced palliative care. Pt states she is feeling well, denies pain, nausea, loss of appetite or shortness of breath. She states they are waiting on results from the bronchoscopy and that she may go down to get mask made today for radiation. She states she is in good spirits and has no complaints, she is hopeful about cancer treatment options. She states she does not have a HCPOA, but is aware that her JESSAK is her only daughter Preet Ash so she is not interested in completing one. Also discussed code status in depth and she would like to remain a full code. 1. Goals of Care/Advanced Care planning/Code status: Full code, continue with aggressive measures as per pt's wishes  2. Pain: Pt denies, she has orders for tylenol 650mg PO q4h as needed for pain. 3. SOB: Pt denies, she is resting comfortably on her chair on 2L NC after working with PT/OT   Disposition: Pt AM PAC score 20/24 with PT, likely home when medically ready    Reason for Consult:         _x____ Goals of Care  __x___ Code Status Discussion/Advanced Care Planning   __x___ Psychosocial/Family Support  _____ Symptom Management  _____ Other [facility])    Requesting Physician: Dr. Osman Rowe:  Fatigue    History Obtained From:  electronic medical record    History of Present Illness:         Ms Deysi Moise is a 59 yo female with PMH of colon cancer s/p resection in 2008, breast cancer s/p resection in 2017 and osteoarthritis. She initially presented to [facility] ED on 2/9 with fatigue/HA over the past month as well as 15-25lb weight loss over the last month and hoarseness in voice. She had been seen by her PCP, was found to be anemic and started on iron replacement. She also c/o SOB and had an episode of melena.  CT abd/pelvis showed 13 cm CLAYTON bronchogenic carcinoma with extensive pulmonary mets. Pt also underwent bronchoscopy with results pending. MRI brain revealed mets with mass effect and midline shift. Neurosurgery recommended transfer to [facility] ICU for neuro checks. Subjective:                     Past Medical History:        Diagnosis Date    Arachnoiditis     Arthritis     Cancer ([address]     colon, PRIMARY BREAST       Past Surgical History:        Procedure Laterality Date    BACK SURGERY      BRONCHOSCOPY N/A 2/10/2020    BRONCHOSCOPY W/EBUS FNA performed by José Miguel Jackson MD at [address] [place] [address] 2/10/2020    BRONCHOSCOPY BRUSHINGS performed by José Miguel Jackson MD at [address] N/A 2/10/2020    BRONCHOSCOPY ALVEOLAR LAVAGE performed by José Miguel Jackson MD at [address] N/A 2/10/2020    BRONCHOSCOPY ENDOBRONCHIAL ULTRASOUND performed by José Miguel Jackson MD at [address] Left 10/04/2017    LEFT BREAST TOTAL MASTECTOMY WITH SENTINEL LYMPH NODE BIOPSY       Current Medications:    Current Facility-Administered Medications: [Held by provider] ferrous sulfate tablet 325 mg, 325 mg, Oral, Daily with breakfast  sodium chloride flush 0.9 % injection 10 mL, 10 mL, Intravenous, 2 times per day  sodium chloride flush 0.9 % injection 10 mL, 10 mL, Intravenous, PRN  [Held by provider] acetaminophen (TYLENOL) tablet 650 mg, 650 mg, Oral, Q4H PRN  ondansetron (ZOFRAN) injection 4 mg, 4 mg, Intravenous, Q6H PRN  [Held by provider] atorvastatin (LIPITOR) tablet 80 mg, 80 mg, Oral, Nightly  dexamethasone (DECADRON) injection 10 mg, 10 mg, Intravenous, Q6H  levETIRAcetam (KEPPRA) 500 mg in sodium chloride 0.9 % 100 mL IVPB, 500 mg, Intravenous, Q12H  pantoprazole (PROTONIX) injection 40 mg, 40 mg, Intravenous, Daily    Allergies:  Patient has no known allergies.     Social History:    Pt from home    Review of Systems -   General ROS: negative  Psychological ROS: negative  ENT ROS: negative  Hematological and Lymphatic ROS: negative  Respiratory ROS: no cough, shortness of breath, or wheezing  Cardiovascular ROS: no chest pain or dyspnea on exertion  Gastrointestinal ROS: no abdominal pain, change in bowel habits, or black or bloody stools  Reports good appetite  Genito-Urinary ROS: no dysuria, trouble voiding, or hematuria  Musculoskeletal ROS: negative  Neurological ROS: no TIA or stroke symptoms  Reports HA PTA    Objective:        PHYSICAL EXAM:    /72   Pulse 70   Temp 98.1 °F (36.7 °C) (Oral)   Resp 20   Ht 5' 6\" (1.676 m)   Wt 131 lb 9.8 oz (59.7 kg)   LMP 06/01/2011   SpO2 95%   BMI 21.24 kg/m²   General appearance: alert, appears older than stated age and cooperative  Lungs: clear to auscultation bilaterally and on 2L NC  Heart: regular rate and rhythm, S1, S2 normal, no murmur, click, rub or gallop  Abdomen: soft, non-tender; bowel sounds normal; no masses,  no organomegaly  Extremities: extremities normal, atraumatic, no cyanosis or edema  Skin: Skin color, texture, turgor normal. No rashes or lesions  Neurologic: Grossly normal    Palliative Performance Scale:  60% ? Ambulation reduced; Significant disease; Can't do hobbies/housework; intake normal   or reduced; occasional assist; LOC full/confusion  50% ? Mainly sit/lie; Extensive disease; Can't do any work; Considerable assist; intake normal  Or reduced; LOC full/confusion  40% ? Mainly in bed; Extensive disease; Mainly assist; intake normal or reduced; occasional assist; LOC full/confusion  30% ? Bed Bound; Extensive disease; Total care; intake reduced; LOC full/confusion  20% ? Bed Bound; Extensive disease; Total care; intake minimal; Drowsy/coma  10% ? Bed Bound; Extensive disease; Total care; Mouth care only; Drowsy/coma  0 ?  Death    PPS:     Vitals:    /72   Pulse 70   Temp 98.1 °F (36.7 °C) (Oral)   Resp 20

## 2020-02-11 NOTE — CONSULTS
Clinical Pharmacy Consult Note    60 y.o. female admitted with Brain metastases pending neuro checks. Pharmacy has been asked by Dr. Regina Valdez  to adjust all drips to normal saline as appropriate based on compatibility to avoid fluid shifts since D5 is osmotically active. The following intermittent IV drips/infusions have been adjusted to saline: None at this time. The following medications must remain in D5W due to incompatibility with normal saline:  Amphotericin  Epinephrine  Mycophenolate  Nitroprusside  Penicillin G Potassium    Please be aware that patient has D5W ordered as part of hypoglycemia orderset. [facility] will follow daily to ensure all new IVPBs + drips are in NS. Please call with questions. Thank you!   Gila Goldstein Rph

## 2020-02-11 NOTE — PROGRESS NOTES
Impaired  Vision Exceptions: Wears glasses at all times(decreased peripheral vision on L)  Hearing: Within functional limits     Subjective  General  Chart Reviewed: Yes  Additional Pertinent Hx: Admit 2/11 from [facility] with new findings of lung CA and brain mets; neurosurgery following; PMhx: colon CA, breast CA, arachnoiditis, mastectomy, back surgery  Referring Practitioner: Roque Worthy MD  Diagnosis: brain mets  Subjective  Subjective: Pt found supine in bed. Agreeable to PT. Family present.   Pain Screening  Patient Currently in Pain: Denies       Orientation  Orientation  Overall Orientation Status: Within Functional Limits  Social/Functional History  Social/Functional History  Lives With: Daughter  Type of Home: House  Home Layout: 1/2 bath on main level, Bed/Bath upstairs  Home Access: Stairs to enter without rails(2 PAUL)  Bathroom Shower/Tub: Tub/Shower unit  Bathroom Toilet: Standard  Bathroom Equipment: (none)  Home Equipment: (none)  ADL Assistance: Independent  Homemaking Assistance: Independent  Ambulation Assistance: Independent  Transfer Assistance: Independent  Additional Comments: pt has been indep up until 2 weeks ago - pt has required increased assist with self care, has stopped driving, and has assist with housekeeping duties; pt is home alone during the day when dtr is at work; pt had 1 recent fall when toddler was in her way  Cognition        Objective          AROM RLE (degrees)  RLE AROM: WFL  AROM LLE (degrees)  LLE AROM : WFL  Strength RLE  Strength RLE: WFL  Strength LLE  Strength LLE: WFL        Bed mobility  Supine to Sit: Supervision(HOB elevated)  Transfers  Sit to Stand: Stand by assistance(from EOB, toilet)  Stand to sit: Stand by assistance  Ambulation  Ambulation?: Yes  Ambulation 1  Device: Rollator  Other Apparatus: O2(1.5 L O2)  Assistance: Minimal assistance  Quality of Gait: frequent cues given to avoid running into objects on the L, cues given to scan environment, good balance and endurance, decreased magali  Distance: 10 ft; 200 ft  Comments: increased difficulty with walker management in bathroom     Balance  Sitting - Static: Good  Sitting - Dynamic: Good  Standing - Static: Fair  Standing - Dynamic: Fair      Treatment included gait and transfer training, pt education. Plan   Plan  Times per week: 5-7  Current Treatment Recommendations: Gait Training, Patient/Caregiver Education & Training, Equipment Evaluation, Education, & procurement, Balance Training, Functional Mobility Training, Transfer Training, Home Exercise Program  Safety Devices  Type of devices: Chair alarm in place, Nurse notified, Gait belt, Left in chair    G-Code       OutComes Score                                                  AM-PAC Score  AM-PAC Inpatient Mobility Raw Score : 20 (02/11/20 1020)  AM-PAC Inpatient T-Scale Score : 47.67 (02/11/20 1020)  Mobility Inpatient CMS 0-100% Score: 35.83 (02/11/20 1020)  Mobility Inpatient CMS G-Code Modifier : Jay Webster (02/11/20 1020)          Goals  Short term goals  Time Frame for Short term goals: discharge  Short term goal 1: Pt will transfer sit <--> stand with mod I  Short term goal 2: Pt will ambulate 150 ft with LRAD and mod I  Short term goal 3: Pt will negotiate 1 flight of steps with CGA  Patient Goals   Patient goals : return home       Therapy Time   Individual Concurrent Group Co-treatment   Time In 0917         Time Out 1002         Minutes 45                 Timed Code Treatment Minutes:  30    Total Treatment Minutes:  45    If patient is discharged prior to next treatment, this note will serve as the discharge summary.   Caio Mccullough, PT, DPT  [phone]

## 2020-02-11 NOTE — PROGRESS NOTES
Speech Language Pathology  Facility/Department: [facility] ICU   CLINICAL BEDSIDE SWALLOW EVALUATION/treatment     NAME: Michelle Sherwood  : 1960  MRN: [id number]    ADMISSION DATE: 2020  ADMITTING DIAGNOSIS: has Foot pain; Closed fracture of cuboid bone of foot; Malignant neoplasm of left female breast ([address] Generalized weakness; Mild malnutrition ([address] Cavitating mass in left upper lung lobe; Pulmonary metastases ([address] Hoarseness of voice; Former smoker; Pleural effusion on left; and Brain metastases ([address] on their problem list.  ONSET DATE: 20    Recent Chest Xray 20  Large consolidated opacity left apex.  This is indeterminate and may   represent pneumonia, however underlying mass lesion is not excluded.  CT   chest with contrast recommended       Numerous lung nodules suggesting diffuse metastatic disease. CT of head 20  6 mm right-to-left midline shift.  No metastasis in the right parietal    lobe and left cerebellum with surrounding vasogenic edema.  No hemorrhage. MRI head 2/10/20  Metastases within the supratentorial and infratentorial brain measuring up to   2.5 cm greatest dimension.  This is associated with mass effect and midline   shift measuring up to 9 mm. Date of Eval: 2020  Evaluating Therapist: Oxana Chua    Current Diet level:  Current Diet : NPO  Current Liquid Diet : NPO      Primary Complaint  Patient Complaint: pt is without complaints     Pain:  Pain Assessment  Pain Assessment: 0-10  Pain Level: 0    Reason for Referral  Michelle Sherwood was referred for a bedside swallow evaluation to assess the efficiency of her swallow function, identify signs and symptoms of aspiration and make recommendations regarding safe dietary consistencies, effective compensatory strategies, and safe eating environment. Impression  Pt admitted with c/o fatigue. On this date, the pt reports weak voice x2 weeks. Pt denies difficulty with swallowing.  Pt reported poor po intake due to difficulty with mastication and has been consuming Ensure. Oral- pt is edentulous- reported she had plans to get dentures soon. ROM of oral structures was [facility] [place]. Pt had no difficulty closing lips around spoon or drawing liquid up a straw. There was no anterior spillage or oral residue noted with any consistency. Mastication of Rice Krispies with milk was functional. Did not attempt regular solid as pt reported she eats only soft items. Pharyngeal-  Pt able to initiate swallow without difficulty with laryngeal movement observed. Vocal quality remained clear throughout. Pt noted to produce intermittent throat clearing through out the assessment regardless of consistency. Daughter reported this is normal. . Pt consumed 3oz water uninterrupted by cup with a very delayed throat clear. Dysphagia Diagnosis: Mild to moderate oral stage dysphagia  Dysphagia Outcome Severity Scale: Level 6: Within functional limits/Modified independence     Treatment Plan  Requires SLP Intervention: Yes  Duration/Frequency of Treatment: 2-4x  D/C Recommendations: To be determined       Recommended Diet and Intervention  Diet Solids Recommendation: Regular- to allow pt to choose items she knows she can chew   Liquid Consistency Recommendation: Thin-Make NPO if s/s aspiration emerge and alert SLP  Pt would benefit from dietary consult   Recommended Form of Meds: PO  Recommendations: Dysphagia treatment  Therapeutic Interventions: Diet tolerance monitoring;Patient/Family education    Compensatory Swallowing Strategies  Compensatory Swallowing Strategies: Upright as possible for all oral intake;Small bites/sips    Treatment/Goals  1-The patient will tolerate recommended diet without observed clinical signs of aspiration    2- The pt/family will demonstrate understanding of swallowing recommendations and concerns.   2/110  The pt and family were educated to purpose of the visit, anatomy and physiology of the swallow, s/s of  aspiration, swallowing strategies, diet recommendations and possibility of being made NPO if s/s aspiration emerge. The all stated  comprehension. con't goal      General  Chart Reviewed: Yes  Behavior/Cognition: Alert; Cooperative;Pleasant mood  Respiratory Status: O2 via nasual cannula  Communication Observation: Functional  Follows Directions: Complex  Dentition: Edentulous(planned to get fitted for dentures soon)  Patient Positioning: Upright in bed  Baseline Vocal Quality: Weak  Volitional Cough: Strong  Prior Dysphagia History: pt denied history of dysphagia  Consistencies Administered: Dysphagia Soft and Bite-Sized (Dysphagia III); Thin - cup; Thin - straw; Ice Chips           Vision/Hearing  Vision  Vision: Within Functional Limits  Hearing  Hearing: Within functional limits    Oral Motor Deficits  Oral/Motor  Oral Motor: Within functional limits  pt is edentulous- reported she had plans to get dentures soon. ROM of oral structures was [facility] [place]. Oral Phase Dysfunction     Pt had no difficulty closing lips around spoon or drawing liquid up a straw. There was no anterior spillage or oral residue noted with any consistency. Mastication of Rice Krispies with milk was functional. Did not attempt regular solid as pt reported she eats only soft items. Indicators of Pharyngeal Phase Dysfunction      Pt able to initiate swallow without difficulty with laryngeal movement observed. Vocal quality remained clear throughout. Pt noted to produce intermittent throat clearing through out the assessment regardless of consistency. Daughter reported this is normal. . Pt consumed 3oz water uninterrupted by cup with a very delayed throat clear. Prognosis  Prognosis  Prognosis for safe diet advancement: good  Individuals consulted  Consulted and agree with results and recommendations: Patient; Family member;RN  Family member consulted: daughter and sisters    Education  Patient Education: Role of SLP  Patient Education Response: Verbalizes understanding  Safety Devices in place: Yes  Type of devices: Call light within reach       Therapy Time  SLP Individual Minutes  Time In: 3032  Time Out: 0908  Minutes: 27            Pt's goal:pt denied difficulty with swallowing so did not state goal       Plan:  Continue goals per POC  Recommended diet:regular (to allow pt to choose what she can chew) with thin liquids-Make NPO if s/s aspiration emerge and alert SLP    Pt would benefit from dietary consult   Total treatment time:15dx, 12tx  Pt's discharge plan:to go home   Discharge Plan: To be determined closer to discharge  Discussed with RNReese   Needs within reach.        Avril Pendleton [facility]- [address] [facility]  Pg # [phone]  This document will serve as a discharge summary if pt discharge before next treatment   session

## 2020-02-11 NOTE — PROGRESS NOTES
2000 Charge nurse Rina Tovar make aware this RN does not monitor telemetry     1045  Pt made aware that due to abnormal MRI results of the brain she will be sent to the [facility] Neuro floor Room 4089-7 Transport will be here at approx. 0005    2250 pt chose to call her sister Kelley Quiroz and talk to her about the transfer, she was able to tell the sister that she has a mass in her brain that is pushing and putting pressure on her brain. Pt declined to call her daughter it was too late, she has 2 small children    1145 VSS pt when a wake and talking o2 sat is 93% on RA when relaxing and eyes closed o2 sat 86-88%, placed on 2l NC sat increased to 95%    0005  report was called to [facility] Neuro ICU to [employer], awaiting the ambulance for transfer. 0100 [facility] here to transport pt, report given, paperwork, and belongings with pt on transfer. Pt remains alert and oriented and neurologically intact. just has a horse voice.

## 2020-02-11 NOTE — CONSULTS
ICU [address] [facility] Day: 1  ICU Day: 1                                                        Code:Full Code  Admit Date: 2/11/2020  PCP: Swapna Lloyd MD                                  CC: fatigue     Interval History: Pt seen at bedside this AM, AAO x3. States her HA has resolved today. No complaints. Denies CP, SOB, HA, vision changes, n/v/d, dysuria, dizziness or lightheadedness. HISTORY OF PRESENT ILLNESS:     Jeffie Castleman is a pleasant 60year old F with PMH of colon cancer s/p resection in 2008, breast cancer s/p resection in 2017, and osteoarthritis. She initially presented to [facility] emergency department on 2/9 with the complaint of worsening fatigue and headaches over the past month associated with a 15-25 lb weight loss and hoarseness in voice. She was seen by her PCP and was found to be anemic and was started on iron replacement. She also complained of shortness of breath and endorsed an episode of melena. CT abdomen/pelvis showed an infiltrating 13 cm left upper lobe bronchogenic carcinoma with extensive pulmonary metastases. Patient had bronchoscopy; results pending. MRI brain revealed metastases within the supratentorial and infratentorial brain measuring up to 2.5 cm greatest dimension associated with mass effect and midline  shift measuring up to 9 mm. Neuro surgery consulted who recommended transfer to [facility] ICU for neuro checks. Patient seen and examined at bedside. She doesn't complain of any pain or discomfort. She complains of weakness and fatigue. She also gets occasional headache which usually resolved with tylenol.      PAST HISTORY:     Past Medical History:   Diagnosis Date    Arachnoiditis     Arthritis     Cancer ([address]     colon, PRIMARY BREAST       Past Surgical History:   Procedure Laterality Date    BACK SURGERY      BRONCHOSCOPY N/A 2/10/2020    BRONCHOSCOPY W/EBUS FNA performed by Linh Resendiz MD at [address] [place] [address] cytology of the bronchoscopy performed on 2/9/2020 does not yield specific findings, she will need a tissue biopsy     Microcytic anemia: H/H 9.9/31.8, stable. Iron and TIBC low. Recently started iron replacement as an outpatient. FOBT negative. Episode of dark diarrhea likely 2/2 iron replacement therapy. -- CBC daily; transfuse if <7   -- Continue protonix 40 mg PO.  -- Continue ferrous sulfate 325 mg daily.      History of ductal carcinoma in situ   s/p left mastectomy in 2017     History of colon cancer   s/p bowel resection in 2008    PT: 20/24  OT: 18/24    Code Status:Full Code  FEN: DIET GENERAL;  PPX:  SCD  DISPO: Transfer to [facility]    This patient has been staffed and discussed with Dr. Francine Michael. -----------------------------  Yusuf Howard MD, PGY-1  2/11/2020  2:44 PM  Patient examined, findings as discussed with Dr Magno Corona. Agree with assessment and plan. Effects of brain tumor alleviated with decadron, whole brain radiation started. Awaiting final path report from bronchoscopy specimens. If definitive dx not obtained,consider repeat biopsy with percutaneous CT guided core needle or bronchoscopy with transbronchial biopsy.

## 2020-02-11 NOTE — PLAN OF CARE
Plan of Care    Called Regarding Patient    New Diagnosis of Breast CA with Mets to Lung    MRI Brain with multiple Mets    With mass effect and Midline Shift    Recommend  1. Transfer to [facility] - ICU  2. Q2 hour Neurochecks  3. Decadron 10 q6  4. Keppra 500 BID  5. Pepcid 20 BID or other GERD protector  6. HOB 30 degrees  7.  Consult In AM to discuss options regarding treatment

## 2020-02-11 NOTE — CONSULTS
NEUROSURGERY [facility]  [id number]   1960 2/11/2020    Requesting physician: Liz Mcgee MD    Reason for consultation:    History of present illness: Patient is a 60 y.o. female with PMH of colon cancer s/p resection in 2008, breast cancer s/p resection in 2017, no chemo or radiation, and osteoarthritis. She initially presented to [facility] emergency department on 2/9 with the complaint of worsening fatigue and headaches over the past month associated with a 15-25 lb weight loss and hoarseness in her voice. She was seen by her PCP and was found to be anemic and was started on iron replacement. She also complained of shortness of breath and endorsed an episode of melena. CT abdomen/pelvis showed an infiltrating 13 cm left upper lobe bronchogenic carcinoma with extensive pulmonary metastases. Patient had bronchoscopy. Prelim Positive for non-small cell lung cancer. Results pending. MRI brain revealed metastases within the supratentorial and infratentorial brain measuring up to 2.5 cm greatest dimension associated with mass effect and midline shift measuring up to 9 mm. ROS:   GENERAL:  Denies fever or recent illness.  +25 lb weight loss  EYES:  Denies vision change or diplopia  EARS:  Denies hearing loss  CARDIAC:  Denies chest pain  RESPIRATORY: has had shortness of breath  SKIN:  Denies rash or lesions   HEM:  Denies excessive bruising  PSYCH:  Denies anxiety or depression  NEURO:  + headache, denies numbness or tingling or lateralizing weakness   :  Denies urinary difficulty  GI: Denies nausea, vomiting, diarrhea or constipation  MUSCULOSKELETAL:  No arthralgias    No Known Allergies    Past Medical History:   Diagnosis Date    Arachnoiditis     Arthritis     [address]     colon, PRIMARY BREAST        Past Surgical History:   Procedure Laterality Date    BACK SURGERY      BRONCHOSCOPY N/A 2/10/2020    BRONCHOSCOPY W/EBUS FNA performed by Fidencio Herrera MD at [facility] injection 10 mg  10 mg Intravenous Q6H Hannah Kaba MD   10 mg at 02/11/20 0511    levETIRAcetam (KEPPRA) 500 mg in sodium chloride 0.9 % 100 mL IVPB  500 mg Intravenous Q12H Danyelle Gardner MD   Stopped at 02/11/20 0630    pantoprazole (PROTONIX) injection 40 mg  40 mg Intravenous Daily Danyelle Gardner MD          Objective:  BP 93/63   Pulse 65   Temp 97.9 °F (36.6 °C) (Oral)   Resp 15   Ht 5' 6\" (1.676 m)   Wt 131 lb 9.8 oz (59.7 kg)   LMP 06/01/2011   SpO2 92%   BMI 21.24 kg/m²     Physical Exam:  Patient seen and examined   General: Well developed. Alert and cooperative in no acute distress. HENT: atraumatic, neck supple  Eyes: Optic discs: Not tested  Pulmonary: unlabored respiratory effort  Cardiovascular:  Warm well perfused. No peripheral edema  Gastrointestinal: abdomen soft, NT, ND    Neurologic Exam:  Neurological:  Mental Status: Awake, alert, oriented x 4, speech clear and appropriate  Attention: Intact  Language: No aphasia or dysarthria noted  Sensation: Intact to all extremities to light touch  Coordination: Intact  DTRs:    Right  Left    biceps  2 2   brachioradialis  2 2    Patella  2  2   ankle clonus  - -   toes (babinski)  down down     Cranial Nerves:  Cranial Nerves:  II: Visual acuity not tested, denies new visual changes / diplopia  III, IV, VI: PERRL, 3 mm bilaterally, EOMI, no nystagmus noted  V: Facial sensation intact bilaterally to touch  VII: Face symmetric  VIII: Hearing intact bilaterally to spoken voice  IX: Palate movement equal bilaterally  XI: Shoulder shrug equal bilaterally  XII: Tongue midline    Musculoskeletal:   Gait: Not tested   Assist devices: None   Tone: normal  Motor strength:    Right  Left    Right  Left    Deltoid  5 5  Hip Flex  5 5   Biceps  5 5  Knee Extensors  5 5   Triceps  5 5  Knee Flexors  5 5   Wrist Ext  5 5  Ankle Dorsiflex. 5 5   Wrist Flex  5 5  Ankle Plantarflex.   5 5   Handgrip  5 5  Ext Nikhil Longus  5 5   Thumb Ext  5 5 measuring up to 9 mm.       CT chest:  . Negative for pulmonary embolus. 2. Infiltrating 13 cm left upper lobe bronchogenic carcinoma with extensive   pulmonary metastatic disease. 3. Negative for abdominopelvic metastatic disease. Labs  Recent Labs     02/09/20  1132 02/10/20  0604 02/11/20  0433   * 134* 135*   CL 99 100 100   CO2 20* 21 22   BUN 14 10 9   CREATININE <0.5* <0.5* <0.5*   GLUCOSE 185* 119* 154*   MG 2.00  --   --      Recent Labs     02/09/20  1132 02/10/20  0604 02/11/20  0433   WBC 8.4 6.7 5.3   RBC 4.33 3.92* 3.83*   INR 1.22*  --   --            Patient Active Problem List    Diagnosis Date Noted    Mild malnutrition ([address] 02/10/2020    Cavitating mass in left upper lung lobe 02/10/2020    Pulmonary metastases ([address] 02/10/2020    Hoarseness of voice 02/10/2020    Former smoker 02/10/2020    Pleural effusion on left 02/10/2020    Brain metastases ([address] 02/10/2020    Generalized weakness 02/09/2020    Malignant neoplasm of left female breast ([address]     Closed fracture of cuboid bone of foot 04/03/2014    Foot pain 04/02/2014       Assessment:  Patient is a 59 yo F with PMHx of colon ca, breast ca with mets to lung and now mets to brain who presented with headaches and confusion. Pt has cerebral edema and brain compression. 9mm midline shift noted on MRI. Treating with decadron, HOB >30 degrees, imaging no dextrose in IV's. Plan:        1. No emergent neurosurgical intervention indicated        2. Neurologic exams frequency:q4  3. SCDs for DVT prophylaxis  4. Cerebral edema:  - Decadron 4 mg Q6H  - Keep HOB >30 degrees  - NO dextrose in IVF's or in IV drips  5. Seizure prophylaxis: Keppra 500 mg BID  6. GI Prophylaxis: Protonix  7. Advance diet / activity per primary team  8. Consult rad onc  9. Thanks for consult.  Please call w/ questions or decline in mental status         DISPO-up to medicine    KWAKU Welch-CNP  Neurosurgery Nurse

## 2020-02-12 PROBLEM — C34.12 MALIGNANT NEOPLASM OF UPPER LOBE OF LEFT LUNG (HCC): Status: ACTIVE | Noted: 2020-02-12

## 2020-02-12 LAB
ABO/RH: NORMAL
ANION GAP SERPL CALCULATED.3IONS-SCNC: 16 MMOL/L (ref 3–16)
ANTIBODY SCREEN: NORMAL
BUN BLDV-MCNC: 14 MG/DL (ref 7–20)
CALCIUM SERPL-MCNC: 9.1 MG/DL (ref 8.3–10.6)
CHLORIDE BLD-SCNC: 101 MMOL/L (ref 99–110)
CO2: 22 MMOL/L (ref 21–32)
CREAT SERPL-MCNC: <0.5 MG/DL (ref 0.6–1.2)
CULTURE, RESPIRATORY: NORMAL
ESTIMATED AVERAGE GLUCOSE: 108.3 MG/DL
GFR AFRICAN AMERICAN: >60
GFR NON-AFRICAN AMERICAN: >60
GLUCOSE BLD-MCNC: 140 MG/DL (ref 70–99)
GRAM STAIN RESULT: NORMAL
HBA1C MFR BLD: 5.4 %
HCT VFR BLD CALC: 30.9 % (ref 36–48)
HEMOGLOBIN: 10 G/DL (ref 12–16)
MCH RBC QN AUTO: 25 PG (ref 26–34)
MCHC RBC AUTO-ENTMCNC: 32.2 G/DL (ref 31–36)
MCV RBC AUTO: 77.6 FL (ref 80–100)
PDW BLD-RTO: 15.8 % (ref 12.4–15.4)
PLATELET # BLD: 653 K/UL (ref 135–450)
PMV BLD AUTO: 6.5 FL (ref 5–10.5)
POTASSIUM SERPL-SCNC: 4.7 MMOL/L (ref 3.5–5.1)
RBC # BLD: 3.98 M/UL (ref 4–5.2)
SODIUM BLD-SCNC: 139 MMOL/L (ref 136–145)
WBC # BLD: 10.7 K/UL (ref 4–11)

## 2020-02-12 PROCEDURE — 6360000002 HC RX W HCPCS: Performed by: STUDENT IN AN ORGANIZED HEALTH CARE EDUCATION/TRAINING PROGRAM

## 2020-02-12 PROCEDURE — 6370000000 HC RX 637 (ALT 250 FOR IP): Performed by: STUDENT IN AN ORGANIZED HEALTH CARE EDUCATION/TRAINING PROGRAM

## 2020-02-12 PROCEDURE — 2580000003 HC RX 258: Performed by: STUDENT IN AN ORGANIZED HEALTH CARE EDUCATION/TRAINING PROGRAM

## 2020-02-12 PROCEDURE — 85027 COMPLETE CBC AUTOMATED: CPT

## 2020-02-12 PROCEDURE — 36415 COLL VENOUS BLD VENIPUNCTURE: CPT

## 2020-02-12 PROCEDURE — 77412 RADIATION TX DELIVERY LVL 3: CPT

## 2020-02-12 PROCEDURE — 6370000000 HC RX 637 (ALT 250 FOR IP): Performed by: NURSE PRACTITIONER

## 2020-02-12 PROCEDURE — 1200000000 HC SEMI PRIVATE

## 2020-02-12 PROCEDURE — 97110 THERAPEUTIC EXERCISES: CPT

## 2020-02-12 PROCEDURE — 97116 GAIT TRAINING THERAPY: CPT

## 2020-02-12 PROCEDURE — 80048 BASIC METABOLIC PNL TOTAL CA: CPT

## 2020-02-12 PROCEDURE — C9113 INJ PANTOPRAZOLE SODIUM, VIA: HCPCS | Performed by: STUDENT IN AN ORGANIZED HEALTH CARE EDUCATION/TRAINING PROGRAM

## 2020-02-12 RX ORDER — LEVETIRACETAM 500 MG/1
500 TABLET ORAL EVERY 12 HOURS
Status: DISCONTINUED | OUTPATIENT
Start: 2020-02-12 | End: 2020-02-13 | Stop reason: HOSPADM

## 2020-02-12 RX ORDER — PANTOPRAZOLE SODIUM 40 MG/1
40 TABLET, DELAYED RELEASE ORAL
Status: DISCONTINUED | OUTPATIENT
Start: 2020-02-13 | End: 2020-02-13 | Stop reason: HOSPADM

## 2020-02-12 RX ADMIN — ATORVASTATIN CALCIUM 80 MG: 80 TABLET, FILM COATED ORAL at 21:10

## 2020-02-12 RX ADMIN — LEVETIRACETAM 500 MG: 100 INJECTION, SOLUTION INTRAVENOUS at 05:27

## 2020-02-12 RX ADMIN — LEVETIRACETAM 500 MG: 500 TABLET, FILM COATED ORAL at 17:41

## 2020-02-12 RX ADMIN — Medication 10 ML: at 21:10

## 2020-02-12 RX ADMIN — DEXAMETHASONE SODIUM PHOSPHATE 4 MG: 4 INJECTION, SOLUTION INTRAMUSCULAR; INTRAVENOUS at 05:23

## 2020-02-12 RX ADMIN — DEXAMETHASONE SODIUM PHOSPHATE 4 MG: 4 INJECTION, SOLUTION INTRAMUSCULAR; INTRAVENOUS at 11:30

## 2020-02-12 RX ADMIN — DEXAMETHASONE SODIUM PHOSPHATE 4 MG: 4 INJECTION, SOLUTION INTRAMUSCULAR; INTRAVENOUS at 23:48

## 2020-02-12 RX ADMIN — FERROUS SULFATE TAB 325 MG (65 MG ELEMENTAL FE) 325 MG: 325 (65 FE) TAB at 09:07

## 2020-02-12 RX ADMIN — DEXAMETHASONE SODIUM PHOSPHATE 4 MG: 4 INJECTION, SOLUTION INTRAMUSCULAR; INTRAVENOUS at 17:40

## 2020-02-12 RX ADMIN — PANTOPRAZOLE SODIUM 40 MG: 40 INJECTION, POWDER, FOR SOLUTION INTRAVENOUS at 09:07

## 2020-02-12 RX ADMIN — Medication 10 ML: at 09:07

## 2020-02-12 ASSESSMENT — PAIN SCALES - GENERAL
PAINLEVEL_OUTOF10: 0

## 2020-02-12 NOTE — PROGRESS NOTES
Progress Note       Code:Full Code  Admit Date: 2/11/2020  PCP: Nyla Healy MD                                  CC: No chief complaint on file. Interval Hx:  - seen at bedside this AM , without acute complaints  - denied nausea, vomiting, fever, chills, headache, vision changes, SOB, chest pain, abdominal pain. Remarkable vitals/labs/imaging/mgmt  - Vitals: afebrile, hemodynamically stable, SBP 100s-110s  - Labs: BMP wnl, Hb 10.0 (stable), plt 653 (high), WBC 10.7 (8.4>6.7>5.3)  - I/Os (L): n/a  - Imaging: n/a  - PT/OT PAC: 20/24, 18/24  - d/c plan: home    HISTORY OF PRESENT ILLNESS:   Fatoumata Wren is a pleasant 60year old F with PMH of colon cancer s/p resection in 2008, breast cancer s/p resection in 2017, and osteoarthritis. She initially presented to [facility] emergency department on 2/9 with the complaint of worsening fatigue and headaches over the past month associated with a 15-25 lb weight loss and hoarseness in voice. She was seen by her PCP and was found to be anemic and was started on iron replacement. She also complained of shortness of breath and endorsed an episode of melena. CT abdomen/pelvis showed an infiltrating 13 cm left upper lobe bronchogenic carcinoma with extensive pulmonary metastases. Patient had bronchoscopy; results pending. MRI brain revealed metastases within the supratentorial and infratentorial brain measuring up to 2.5 cm greatest dimension associated with mass effect and midline  shift measuring up to 9 mm. Neuro surgery consulted who recommended transfer to [facility] ICU for neuro checks. Patient seen and examined at bedside. She doesn't complain of any pain or discomfort. She complains of weakness and fatigue. She also gets occasional headache which usually resolved with tylenol.     MEDICATIONS:     No current facility-administered medications on file prior to encounter.       Current Outpatient Medications on File Prior to Encounter   Medication Sig is no abdominal tenderness. Musculoskeletal:      Right lower leg: No edema. Left lower leg: No edema. Skin:     General: Skin is warm and dry. Neurological:      General: No focal deficit present. Mental Status: She is alert and oriented to person, place, and time. Cranial Nerves: No cranial nerve deficit. Sensory: No sensory deficit. Motor: No weakness. DATA:       Labs:  CBC:   Recent Labs     02/10/20  0604  02/10/20  1614 02/11/20  0433 02/12/20  0557   WBC 6.7  --   --  5.3 10.7   HGB 9.7*   < > 9.9* 9.4* 10.0*   HCT 30.0*   < > 31.8* 29.5* 30.9*   *  --   --  553* 653*    < > = values in this interval not displayed. BMP:   Recent Labs     02/10/20  0604 02/11/20  0433 02/12/20  0557   * 135* 139   K 4.4 4.9 4.7    100 101   CO2 21 22 22   BUN 10 9 14   CREATININE <0.5* <0.5* <0.5*   GLUCOSE 119* 154* 140*     LFT's:   Recent Labs     02/09/20  1132   AST 13*   ALT 7*   BILITOT <0.2   ALKPHOS 106     Troponin:   Recent Labs     02/09/20  1132   TROPONINI <0.01     BNP:No results for input(s): BNP in the last 72 hours. ABGs: No results for input(s): PHART, PFF4NMY, PO2ART in the last 72 hours. INR:   Recent Labs     02/09/20  1132 02/11/20  2034   INR 1.22* 1.09       U/A:  Recent Labs     02/10/20  0146   COLORU YELLOW   PHUR 6.5   WBCUA 9*   RBCUA 5*   CLARITYU CLOUDY*   SPECGRAV >1.030   LEUKOCYTESUR SMALL*   UROBILINOGEN 1.0   BILIRUBINUR Negative   BLOODU Negative   GLUCOSEU Negative       CT head without contrast   Final Result       6 mm right-to-left midline shift. No metastasis in the right parietal    lobe and left cerebellum with surrounding vasogenic edema. No hemorrhage. ASSESSMENT AND PLAN:     Bronchogenic carcinoma of the left lung: new 13cm left upper lobe mass seen on CT abdomen/pelvis. Bronchoscopy on 2/9 preliminary positive for non-small cell lung cancer. -- Supplemental oxygen as needed.   -- Oncology

## 2020-02-12 NOTE — PROGRESS NOTES
Physical Therapy  Daily Treatment Note    Discharge Recommendations: Elio Boston scored a 19/24 on the AM-PAC short mobility form. Current research shows that an AM-PAC score of 18 or greater is typically associated with a discharge to the patient's home setting. Based on the patients AM-PAC score and their current functional mobility deficits, it is recommended that the patient have 2-3 sessions per week of Physical Therapy at d/c to increase the patients independence. Assessment:  Pt with improved gait tolerance. Did well on stairs with B rails. Pt needs cueing for occasional L UE neglect and for scanning environment on L. Pt would benefit from 24 hour assist at home and continued PT. Pt has a wheeled walker at home. HOME HEALTH CARE: LEVEL 1 STANDARD  - Initial home health evaluation to occur within 24-48 hours, in patient home   - Therapy to evaluate with goal of regaining prior level of functioning   - Therapy to evaluate if patient has [address] needs for personal care    Equipment Needs: No (daughter purchased a wheeled walker)  Chart Reviewed: Yes     Other position/activity restrictions: up with assist, seizure precautions   Additional Pertinent Hx: Admit 2/11 from [facility] with new findings of lung CA and brain mets; neurosurgery following; PMhx: colon CA, breast CA, arachnoiditis, mastectomy, back surgery      Diagnosis: brain mets   Treatment Diagnosis: impaired gait and transfers    Subjective: Pt in bed initially. Friend visiting. Pt hopes to be D/Jeffrey home soon. States she will have 24 hour assist of family and good friend. Daughter purchased a wheeled walker. Pt has 2 steps into house with B rails. Will be staying on main level once inside house.      Pain: Denies    Objective:    Bed mobility  Supine to sit: Supervision (HOB up with use of rail)  Sit to Supine: Supervision (HOB up)    Transfers  Sit to stand: SBA from bed; SBA from chair  Stand to sit: SBA into chair; SBA onto bed  Other: Cues for hand placement with transfers when using walker. Ambulation  Assistance Level: CGA  Assistive device: Wheeled walker  Distance: 150 ft, 200 ft in garcia; 15 ft in room. Quality of gait: No LOB; pt often veers one direction, then overcorrects by veering other direction  Other: Cues to scan environment on L to avoid obstacles. Pt running into obstacles with walker x 2 occasions during walks. Stairs  Up/down 2 steps x 2 (4 total) with B rails CGA. Exercises  In stance with B UE support of walker: 10 reps B heel raises, toe raises with SBA; 10 reps B semi-squats, marching with CGA    Balance  Sat EOB with SBA  Static stance with walker CGA  Ambulation with wheeled walker CGA    Patient Education  Scanning environment on L due to decreased vision L periphery. Needs reinforcement. Occasional reminders to use L hand (when holding onto walker, when holding onto stair railing). Needs reinforcement. Hand placement with transfers when using walker. Needs reinforcement. Calling for assist with needs. Expressed understanding. Safety Devices  Pt left with needs in reach. In bed with bed alarm on. RN updated. Friend present.      AM-PAC score  AM-PAC Inpatient Mobility Raw Score : 19  AM-PAC Inpatient T-Scale Score : 45.44  Mobility Inpatient CMS 0-100% Score: 41.77  Mobility Inpatient CMS G-Code Modifier : CK    Goals: (as determined and assessed by primary PT)  Time Frame for Short term goals: discharge  Short term goal 1: Pt will transfer sit <--> stand with mod I   Short term goal 2: Pt will ambulate 150 ft with LRAD and mod I   Short term goal 3: Pt will negotiate 1 flight of steps with CGA      Plan:  Times per week: 5-7;    Current Treatment Recommendations: Gait Training, Patient/Caregiver Education & Training, Equipment Evaluation, Education, & procurement, Balance Training, Functional Mobility Training, Transfer Training, Home Exercise Program    Therapy Time    Individual  Concurrent  Group  Co-treatment    Time In  1354            Time Out  1421            Minutes  27              Timed Code Treatment Minutes: 27  Total Treatment Minutes: 27    Will continue per plan of care. If patient is discharged prior to next treatment, this note will serve as the discharge summary. Patsy Mckinnon #[id number]    No goals met this date. Continue working towards goals.   Kristina Bundy, PT, DPT  [phone]

## 2020-02-12 NOTE — PROGRESS NOTES
Speech Language Pathology  Dysphagia Attempt- x2    Chart reviewed. Attempted to see pt for dysphagia tx and address hoarse voice; currently beginning session w/ PT. Will attempt to see pt as available and schedule allows. Addendum- 2:58pm  OT informed SLP that pt recently refused tx upon arrival at room; attempted to see but pt refused dysphagia tx. Educated to recommendation for ENT consult given metastatic disease and implications on voice (ie possible paresis/paralysis), however, pt refused ENT consult as well. Counseled to monitor dysphonia and dysphagia, including s/s of aspiration, and follow-up w/ ENT as needed. Thank you,    Hugh Browne) [place], [place], [address] [facility]; [phone]  Speech-Language PathologistPager #: [phone], 2:02pm

## 2020-02-12 NOTE — CARE COORDINATION
cancer therapy at [facility] post discharge. Family to provide transport. The Plan for Transition of Care is related to the following treatment goals   Brain metastases [facility]) C79.31         The Patient and/or patient representative Patient was provided with a choice of provider and agrees with the discharge plan Yes    Freedom of choice list was provided with basic dialogue that supports the patient's individualized plan of care/goals and shares the quality data associated with the providers.  Yes    Care Transition patient: No    CYRUS Reese  The [address] [place] [address] ICU  Case Management Department  Ph: [phone]

## 2020-02-12 NOTE — CONSULTS
NEUROSURGERY Sandie Kebede  [id number]   1960 2/12/2020    Requesting physician: Conner Mondragon MD    Reason for consultation:    History of present illness: Patient is a 60 y.o. female with PMH of colon cancer s/p resection in 2008, breast cancer s/p resection in 2017, no chemo or radiation, and osteoarthritis. She initially presented to [facility] emergency department on 2/9 with the complaint of worsening fatigue and headaches over the past month associated with a 15-25 lb weight loss and hoarseness in her voice. She was seen by her PCP and was found to be anemic and was started on iron replacement. She also complained of shortness of breath and endorsed an episode of melena. CT abdomen/pelvis showed an infiltrating 13 cm left upper lobe bronchogenic carcinoma with extensive pulmonary metastases. Patient had bronchoscopy. Prelim Positive for non-small cell lung cancer. Results pending. MRI brain revealed metastases within the supratentorial and infratentorial brain measuring up to 2.5 cm greatest dimension associated with mass effect and midline shift measuring up to 9 mm. ROS:   GENERAL:  Denies fever or recent illness.  +25 lb weight loss  EYES:  Denies vision change or diplopia  EARS:  Denies hearing loss  CARDIAC:  Denies chest pain  RESPIRATORY: has had shortness of breath  SKIN:  Denies rash or lesions   HEM:  Denies excessive bruising  PSYCH:  Denies anxiety or depression  NEURO:  + headache, denies numbness or tingling or lateralizing weakness   :  Denies urinary difficulty  GI: Denies nausea, vomiting, diarrhea or constipation  MUSCULOSKELETAL:  No arthralgias    No Known Allergies    Past Medical History:   Diagnosis Date    Arachnoiditis     Arthritis     Cancer ([address]     colon, PRIMARY BREAST        Past Surgical History:   Procedure Laterality Date    BACK SURGERY      BRONCHOSCOPY N/A 2/10/2020    BRONCHOSCOPY W/EBUS FNA performed by Lori Donaldson MD at [facility]  ondansetron (ZOFRAN) injection 4 mg  4 mg Intravenous Q6H PRN Kathie Ghotra MD        atorvastatin (LIPITOR) tablet 80 mg  80 mg Oral Nightly Kathie Ghotra MD   80 mg at 02/11/20 2123    dexamethasone (DECADRON) injection 4 mg  4 mg Intravenous Q6H Willis Fowler MD   4 mg at 02/12/20 1130      Objective:  BP (!) 103/52 Comment: nurse notified  Pulse 75   Temp 97.9 °F (36.6 °C) (Oral)   Resp 17   Ht 5' 6\" (1.676 m)   Wt 131 lb 9.8 oz (59.7 kg)   LMP 06/01/2011   SpO2 93%   BMI 21.24 kg/m²     Physical Exam:  Patient seen and examined   General: Well developed. Alert and cooperative in no acute distress. HENT: atraumatic, neck supple  Eyes: Optic discs: Not tested  Pulmonary: unlabored respiratory effort  Cardiovascular:  Warm well perfused. No peripheral edema  Gastrointestinal: abdomen soft, NT, ND    Neurologic Exam:  Neurological:  Mental Status: Awake, alert, oriented x 4, speech clear and appropriate  Attention: Intact  Language: No aphasia or dysarthria noted  Sensation: Intact to all extremities to light touch  Coordination: Intact  DTRs:    Right  Left    biceps  2 2   brachioradialis  2 2    Patella  2  2   ankle clonus  - -   toes (babinski)  down down     Cranial Nerves:  Cranial Nerves:  II: Visual acuity not tested, denies new visual changes / diplopia  III, IV, VI: PERRL, 3 mm bilaterally, EOMI, no nystagmus noted  V: Facial sensation intact bilaterally to touch  VII: Face symmetric  VIII: Hearing intact bilaterally to spoken voice  IX: Palate movement equal bilaterally  XI: Shoulder shrug equal bilaterally  XII: Tongue midline    Musculoskeletal:   Gait: Not tested   Assist devices: None   Tone: normal  Motor strength:    Right  Left    Right  Left    Deltoid  5 5  Hip Flex  5 5   Biceps  5 5  Knee Extensors  5 5   Triceps  5 5  Knee Flexors  5 5   Wrist Ext  5 5  Ankle Dorsiflex. 5 5   Wrist Flex  5 5  Ankle Plantarflex.   5 5   Handgrip  5 5  Ext Nikhil Longus  5 5   Thumb Ext  5

## 2020-02-12 NOTE — PROGRESS NOTES
Occupational Therapy        No Treatment     Attempted to see pt this afternoon. Pt was recently seen by PT and did not want to participate in [address] [place] [address] activities. Will check back tomorrow per POC.      Dick Bedoya, Becky 77 OTR/L  [phone]

## 2020-02-12 NOTE — PLAN OF CARE
Problem: Falls - Risk of:  Goal: Will remain free from falls  Description  Will remain free from falls  2/12/2020 0933 by Mansoor Cespedes RN  Outcome: Ongoing  Note:   Patient will remain free from falls throughout shift. Up x1 CGA with gait belt and walker. Fall precautions in place. Non-skid socks on. Bed locked in lowest position with alarm on and 2/4 side rails up. Bedside table, belongings, and call light placed within reach. Patient using call light appropriately when needing assistance. Hourly rounding in anticipation of patient needs. Floor clean and free from clutter. Room door open. Will continue to monitor. Problem: Verbal Communication - Impaired:  Goal: Functional communication will improve  Description  Functional communication will improve  2/12/2020 0933 by Mansoor Cespedes RN  Outcome: Ongoing  Note:   Patient exhibiting a raspy, hoarse voice as well as infrequent non-productive cough. No dysphagia or dysarthria noted. Able to express needs/wants and following commands. Will continue to monitor.

## 2020-02-12 NOTE — ONCOLOGY
[address]    [phone]    DATE: 2/12/20    AREA TREATED: WHOLE BRAIN    DAILY DOSE: 400 cGy    CUMULATIVE DOSE: 800 cGy    # 2  OUT OF 5  TREATMENTS    NEXT PLANNED TREATMENT  DATE: 2/13/20 8:30AM    Daily Treatments are Monday through Friday:       INPATIENT CODING:  Beam Radiation   Photons   Photon energy : 1-10mv

## 2020-02-12 NOTE — PLAN OF CARE
Problem: Falls - Risk of:  Goal: Will remain free from falls  Description  Fall precautions in place. Bed is in lowest position, wheels locked and alarm on. Non-skid socks on. Call light and bedside table within reach. Pt calls out appropriately. Pt is up x 1 assist with walker and gait belt. Will continue to assess and monitor. Outcome: Ongoing       Problem: Verbal Communication - Impaired:  Goal: Functional communication will improve  Description  Functional communication will improve. Patient has no issues communicating needs and wants. Speech is raspy, but clear. No slurring of words noted. Will continue to monitor and assess.    Outcome: Ongoing

## 2020-02-12 NOTE — ADT AUTH CERT
Systemic or Infectious Condition GRG - Care Day 2 (2/10/2020) by Doretha Whaley RN         Review Status Review Entered   Completed 2/12/2020 12:12       Criteria Review      Care Day: 2 Care Date: 2/10/2020 Level of Care:    Guideline Day 2    Level Of Care    (X) Floor    Clinical Status    (X) * No ICU or intermediate care needs    Interventions    (X) Inpatient interventions continue    2/12/2020 12:12 PM EST by Jose River      Decadron 10 mg IV x1 given  Decadron 4 mg PO 4x daily  Protonix 40 mg IV daily  Tylenol PO x1  Keppra PO 2x daily  Flu vaccine    * Milestone   Additional Notes   2/10/20     Remains on Telemetry Unit      Orders: Neuro checks, SCD's, NPO, Telemetry, Strict bedrest, PT OT, Consult Neurosurgery, Stroke education, Swallow screen, Seizure precautions, NIHSS, Elevate head of bed, Kirkman coma scale, Consult critical care, Consult Oncology      Labs:   Sodium 134   H&H 9.7/30.0   H&H 10.2/32.0   H&H 9.9/31.8   Urine culture pending      MRI Brain:   Metastases within the supratentorial and infratentorial brain measuring up to   2.5 cm greatest dimension.  This is associated with mass effect and midline   shift measuring up to 9 mm.          Vitals:   96.6 Temporal- 109- 21   /107   93% on 6LNC simple mask      Oncology Note:   Lung mass   - No evidence of abdominopelvic metastasis   - Will obtain MRI brain   - Need tissue biopsy for diagnosis   - Pulmonology consulted for bronchoscopy           Anemia   - Taking PO iron prior to admission   - Iron studies pending               History DCIS   - Left mastectomy in 2017           History of colon cancer   - S/p resection in 2008          Pulmonology Note:    Bronchoscopy note       Patient admitted to the hospital with increased weakness along with that patient had hoarseness of voice and was found to have large left upper lobe lung mass along with that patient had hilar adenopathy and pulmonary metastasis and given the patient's clinical status and radiology it was decided to do a bronchoscopy with endobronchial ultrasound and needle biopsy and for that reason after informed consent, the patient was taken to the endoscopy suite, patient was given IV sedation by the anesthesia team, please refer to anesthesia sheet for ASA and Mallampati scores, the bronchoscope was introduced to the Igel tube; patient's trachea was normal, patient's right tracheobronchial tree was normal, patient's main anay was sharp, patient had endobronchial mass in the left upper lobe macroscopically and BAL along with bronchial brushings were done along with that needle biopsies from the left upper lobe mass under ultrasound along with needle biopsy of the right hilar node was done, preliminary results as per the pathologist shows that patient has non-small cell malignancy, which needs to be further evaluated in terms of whether it is primary lung or coming from breast or colon depending on the cell block; patient's bronchoscopy findings were discussed with patient's family, patient did not have any apparent complications   Estimated blood loss was less than 10 mL   Further management depending on patient's clinical status and the final bronchoscopy results      Plan:    · Oxygen supplementation to keep saturation between 90 and 94% only   · Pulmonary toilet   · Patient and family were shown the CT scan of the chest along with findings differential diagnosis and implications   · Patient has a large lung mass in the left upper lobe along with that patient has extensive pulmonary metastasis   · Patient also has small left pleural effusion on CT of the chest   · Patient has history of breast cancer status post mastectomy without any chemoradiation therapy in the past   · Patient also has history of smoking till October 2019   · Patient's hoarseness of voice may be secondary to irritation or pressure on the left recurrent laryngeal nerve because of the lung mass   · Patient does not need any antibiotics from pulmonary standpoint of view   · Patient needs histopathological diagnosis to assess for etiology of the lung mass   · If patient has lung cancer, patient and family were told that it is not localized to an area   · Patient may require chemo or radiation therapy or both depending on the biopsy results   · Patient can be given bronchodilators on whenever necessary basis   · PUD and DVT prophylaxis as per IM          Internal Medicine Note:   New 13cm CLAYTON lung mass consistent with metastatic bronchiogenic carcinoma. -.   Status post bronchoscopy-  Prelim Positive for non-small cell lung cancer.  Await biopsy results.   -Oncology consulted.  Await recommendations.       History of breast cancer s/p mastectomy.  No chemo / radiation.       Acute vs chronic anemia.  Last Hgb on file from 2011 which was normal.  No gross foci of bleeding   Dark diarrhea episode x2.  Suspect 2/2 iron replacement   - recently started on iron replacement therapy as outpt   - FOBT neg in ED   - empiric protonix 40 qday for now   -Continue to monitor H&H.       Generalized weaknesss 2/2 above       DVT Prophylaxis: scds       Diet: DIET GENERAL;   Dietary Nutrition Supplements: Standard High Calorie Oral Supplement   Code Status: Full Code       PT/OT Eval Status: not needed       Dispo - inpt          Internal Medicine Note:   Notified of critical results of brain MRI. Metastases within supratentorial and infratentorial brain measuring up to 2.5 cm greatest dimension. This is associated with mass effect and midline shift measuring up to 9 mm. Spoke with Dr. Jimi Lezama, neurgosurgeon on call for [facility] regarding possible transfer. Neurosurgery in agreement with transfer to [facility] and requests admission via hospitalist service. Spoke with hospitalist, Dr Myrtle Dela Cruz who is in agreement with transfer. Dexamethasone and Keppra initiated per neurosurgery request. Patient is currently asymptomatic. VSS.  She is in agreement with transfer. Neurosurgery Note:   Plan of Care       Called Regarding Patient       New Diagnosis of Breast CA with Mets to Lung       MRI Brain with multiple Mets       With mass effect and Midline Shift       Recommend   1. Transfer to [facility] - ICU   2. Q2 hour Neurochecks   3. Decadron 10 q6   4. Keppra 500 BID   5. Pepcid 20 BID or other GERD protector   6. HOB 30 degrees   7.  Consult In AM to discuss options regarding treatment

## 2020-02-12 NOTE — CARE COORDINATION
Case Management Assessment           Daily Note                 Date/ Time of Note: 2/12/2020 11:22 AM         Note completed by: Jose Miguel Oconnor     Spoke with patient regarding discharge needs. Pt is from home and will return to home. She does not need home care and will have family with her 24/7. Pt has a RW at home and will have no needs. Patient Name: Sondra Gardner  YOB: 1960    Diagnosis:Brain metastases ([address] [C79.31]  Brain [facility] [address] [address] [facility] [C79.31]  Patient Admission Status: Inpatient    Date of Admission:2/11/2020  2:30 AM Length of Stay: 1 GLOS:        Current Plan of Care: Return to home at d/c  ________________________________________________________________________________________  PT AM-PAC: 20 / 24 per last evaluation on: 02/12/2020    OT AM-PAC: 18 / 24 per last evaluation on: 02/12/2020    DME Needs for discharge: NA    ________________________________________________________________________________________  Discharge Plan: Home    Tentative discharge date: TBD    Current barriers to discharge: none noted    Referrals completed: Not Applicable    Resources/ information provided: Not indicated at this time  ________________________________________________________________________________________  Case Management Notes:as stated above      [place] Cinthia and her family were provided with choice of provider; she and her family are in agreement with the discharge plan.     Care Transition Patient: Brittney Oconnor RN  [facility]  Case Management Department  Ph: [phone]  Fax: [phone]

## 2020-02-13 ENCOUNTER — APPOINTMENT (OUTPATIENT)
Dept: GENERAL RADIOLOGY | Age: 60
DRG: 054 | End: 2020-02-13
Attending: INTERNAL MEDICINE
Payer: COMMERCIAL

## 2020-02-13 ENCOUNTER — APPOINTMENT (OUTPATIENT)
Dept: CT IMAGING | Age: 60
DRG: 054 | End: 2020-02-13
Attending: INTERNAL MEDICINE
Payer: COMMERCIAL

## 2020-02-13 VITALS
HEIGHT: 66 IN | OXYGEN SATURATION: 93 % | SYSTOLIC BLOOD PRESSURE: 107 MMHG | BODY MASS INDEX: 21.15 KG/M2 | HEART RATE: 69 BPM | WEIGHT: 131.61 LBS | TEMPERATURE: 98 F | RESPIRATION RATE: 16 BRPM | DIASTOLIC BLOOD PRESSURE: 65 MMHG

## 2020-02-13 LAB
ANION GAP SERPL CALCULATED.3IONS-SCNC: 14 MMOL/L (ref 3–16)
BLOOD CULTURE, ROUTINE: NORMAL
BUN BLDV-MCNC: 13 MG/DL (ref 7–20)
CALCIUM SERPL-MCNC: 9.1 MG/DL (ref 8.3–10.6)
CHLORIDE BLD-SCNC: 99 MMOL/L (ref 99–110)
CO2: 23 MMOL/L (ref 21–32)
CREAT SERPL-MCNC: <0.5 MG/DL (ref 0.6–1.2)
CULTURE, BLOOD 2: NORMAL
GFR AFRICAN AMERICAN: >60
GFR NON-AFRICAN AMERICAN: >60
GLUCOSE BLD-MCNC: 122 MG/DL (ref 70–99)
HCT VFR BLD CALC: 30.6 % (ref 36–48)
HEMOGLOBIN: 9.7 G/DL (ref 12–16)
MCH RBC QN AUTO: 24.7 PG (ref 26–34)
MCHC RBC AUTO-ENTMCNC: 31.9 G/DL (ref 31–36)
MCV RBC AUTO: 77.4 FL (ref 80–100)
PDW BLD-RTO: 15.6 % (ref 12.4–15.4)
PLATELET # BLD: 595 K/UL (ref 135–450)
PMV BLD AUTO: 6.6 FL (ref 5–10.5)
POTASSIUM SERPL-SCNC: 4.7 MMOL/L (ref 3.5–5.1)
RBC # BLD: 3.95 M/UL (ref 4–5.2)
SODIUM BLD-SCNC: 136 MMOL/L (ref 136–145)
WBC # BLD: 9.6 K/UL (ref 4–11)

## 2020-02-13 PROCEDURE — 6370000000 HC RX 637 (ALT 250 FOR IP): Performed by: NURSE PRACTITIONER

## 2020-02-13 PROCEDURE — 2580000003 HC RX 258: Performed by: STUDENT IN AN ORGANIZED HEALTH CARE EDUCATION/TRAINING PROGRAM

## 2020-02-13 PROCEDURE — 97116 GAIT TRAINING THERAPY: CPT

## 2020-02-13 PROCEDURE — 71045 X-RAY EXAM CHEST 1 VIEW: CPT

## 2020-02-13 PROCEDURE — 88305 TISSUE EXAM BY PATHOLOGIST: CPT

## 2020-02-13 PROCEDURE — 36415 COLL VENOUS BLD VENIPUNCTURE: CPT

## 2020-02-13 PROCEDURE — 6360000002 HC RX W HCPCS: Performed by: RADIOLOGY

## 2020-02-13 PROCEDURE — 77412 RADIATION TX DELIVERY LVL 3: CPT

## 2020-02-13 PROCEDURE — 6370000000 HC RX 637 (ALT 250 FOR IP): Performed by: STUDENT IN AN ORGANIZED HEALTH CARE EDUCATION/TRAINING PROGRAM

## 2020-02-13 PROCEDURE — 80048 BASIC METABOLIC PNL TOTAL CA: CPT

## 2020-02-13 PROCEDURE — 85027 COMPLETE CBC AUTOMATED: CPT

## 2020-02-13 PROCEDURE — 88342 IMHCHEM/IMCYTCHM 1ST ANTB: CPT

## 2020-02-13 PROCEDURE — 2709999900 CT NEEDLE BIOPSY LUNG PERCUTANEOUS

## 2020-02-13 PROCEDURE — 88341 IMHCHEM/IMCYTCHM EA ADD ANTB: CPT

## 2020-02-13 PROCEDURE — 0B9G8ZX DRAINAGE OF LEFT UPPER LUNG LOBE, VIA NATURAL OR ARTIFICIAL OPENING ENDOSCOPIC, DIAGNOSTIC: ICD-10-PCS | Performed by: RADIOLOGY

## 2020-02-13 PROCEDURE — 6360000002 HC RX W HCPCS: Performed by: STUDENT IN AN ORGANIZED HEALTH CARE EDUCATION/TRAINING PROGRAM

## 2020-02-13 RX ORDER — MIDAZOLAM HYDROCHLORIDE 1 MG/ML
0.5 INJECTION INTRAMUSCULAR; INTRAVENOUS ONCE
Status: COMPLETED | OUTPATIENT
Start: 2020-02-13 | End: 2020-02-13

## 2020-02-13 RX ORDER — DEXAMETHASONE 0.5 MG/1
TABLET ORAL
Qty: 30 TABLET | Refills: 0 | Status: SHIPPED | OUTPATIENT
Start: 2020-02-13 | End: 2020-02-13 | Stop reason: SDUPTHER

## 2020-02-13 RX ORDER — LEVETIRACETAM 500 MG/1
500 TABLET ORAL EVERY 12 HOURS
Qty: 60 TABLET | Refills: 3 | Status: SHIPPED | OUTPATIENT
Start: 2020-02-13

## 2020-02-13 RX ORDER — LEVETIRACETAM 500 MG/1
500 TABLET ORAL EVERY 12 HOURS
Qty: 60 TABLET | Refills: 3 | Status: SHIPPED | OUTPATIENT
Start: 2020-02-13 | End: 2020-02-13

## 2020-02-13 RX ORDER — DEXAMETHASONE 1 MG
TABLET ORAL
Qty: 30 TABLET | Refills: 0 | Status: SHIPPED | OUTPATIENT
Start: 2020-02-13

## 2020-02-13 RX ORDER — ATORVASTATIN CALCIUM 80 MG/1
80 TABLET, FILM COATED ORAL NIGHTLY
Qty: 30 TABLET | Refills: 3 | Status: SHIPPED | OUTPATIENT
Start: 2020-02-13

## 2020-02-13 RX ORDER — PANTOPRAZOLE SODIUM 40 MG/1
40 TABLET, DELAYED RELEASE ORAL
Qty: 30 TABLET | Refills: 3 | Status: SHIPPED | OUTPATIENT
Start: 2020-02-14 | End: 2020-02-13

## 2020-02-13 RX ORDER — PANTOPRAZOLE SODIUM 40 MG/1
40 TABLET, DELAYED RELEASE ORAL
Qty: 30 TABLET | Refills: 3 | Status: SHIPPED | OUTPATIENT
Start: 2020-02-14

## 2020-02-13 RX ORDER — ATORVASTATIN CALCIUM 80 MG/1
80 TABLET, FILM COATED ORAL NIGHTLY
Qty: 30 TABLET | Refills: 3 | Status: SHIPPED | OUTPATIENT
Start: 2020-02-13 | End: 2020-02-13

## 2020-02-13 RX ADMIN — PANTOPRAZOLE SODIUM 40 MG: 40 TABLET, DELAYED RELEASE ORAL at 05:51

## 2020-02-13 RX ADMIN — FERROUS SULFATE TAB 325 MG (65 MG ELEMENTAL FE) 325 MG: 325 (65 FE) TAB at 09:54

## 2020-02-13 RX ADMIN — DEXAMETHASONE SODIUM PHOSPHATE 4 MG: 4 INJECTION, SOLUTION INTRAMUSCULAR; INTRAVENOUS at 09:54

## 2020-02-13 RX ADMIN — LEVETIRACETAM 500 MG: 500 TABLET, FILM COATED ORAL at 05:51

## 2020-02-13 RX ADMIN — DEXAMETHASONE SODIUM PHOSPHATE 4 MG: 4 INJECTION, SOLUTION INTRAMUSCULAR; INTRAVENOUS at 05:51

## 2020-02-13 RX ADMIN — MIDAZOLAM HYDROCHLORIDE 0.5 MG: 2 INJECTION, SOLUTION INTRAMUSCULAR; INTRAVENOUS at 08:48

## 2020-02-13 RX ADMIN — Medication 10 ML: at 09:55

## 2020-02-13 ASSESSMENT — PAIN SCALES - GENERAL
PAINLEVEL_OUTOF10: 0
PAINLEVEL_OUTOF10: 0

## 2020-02-13 ASSESSMENT — ENCOUNTER SYMPTOMS
CONSTIPATION: 0
COUGH: 0
DIARRHEA: 0
WHEEZING: 0
ABDOMINAL DISTENTION: 0
PHOTOPHOBIA: 0
APNEA: 0
ABDOMINAL PAIN: 0
VOMITING: 0
CHEST TIGHTNESS: 0
CHOKING: 0
SHORTNESS OF BREATH: 0
NAUSEA: 1
TROUBLE SWALLOWING: 0
VOICE CHANGE: 1
STRIDOR: 0

## 2020-02-13 NOTE — PROGRESS NOTES
Pt discharged via wheelchair with  and all belongings. IV removed. Discharge instructions reviewed, all questions answered.

## 2020-02-13 NOTE — PROGRESS NOTES
bronchoscopy (N/A, 2/10/2020); bronchoscopy (N/A, 2/10/2020); and bronchoscopy (N/A, 2/10/2020). Restrictions  Position Activity Restriction  Other position/activity restrictions: up with assist, seizure precautions  Subjective   General  Chart Reviewed: Yes  Additional Pertinent Hx: Admit 2/11 from [facility] with new findings of lung CA and brain mets; neurosurgery following; PMhx: colon CA, breast CA, arachnoiditis, mastectomy, back surgery  Family / Caregiver Present: Yes(Family member)  Referring Practitioner: Sudeep Lino MD  Subjective  Subjective: \"I'm itching to get out of here. \"  General Comment  Comments: Pt found supine in bed upon PT arrival.  Pt agreeable to therapy session.   Pain Screening  Patient Currently in Pain: Denies  Vital Signs  Patient Currently in Pain: Denies       Orientation     Cognition      Objective   Bed mobility  Supine to Sit: Supervision  Sit to Supine: Supervision  Comment: HOB elevated  Transfers  Sit to Stand: Stand by assistance(from EOB to RW, verbal cues for hand placement, increased time for LUE placement)  Stand to sit: Stand by assistance(verbal cues for hand placement and eccentric control)  Ambulation  Ambulation?: Yes  Ambulation 1  Surface: level tile  Device: Rolling Walker  Assistance: Contact guard assistance;Stand by assistance(pt fluctuating between CGA-SBA throughout bout)  Quality of Gait: decreased magali, forward trunk flexion, pt frequently bumping into objects on L  Distance: 150'  Comments: Verbal cues for L sided awareness  Stairs/Curb  Stairs?: Yes  Stairs  # Steps : 11  Stairs Height: 6\"  Rails: Right ascending(and R descending)  Assistance: Contact guard assistance  Comment: pt alternating between step to leading with RLE and reciprocol pattern, verbal cues for safety/use of step to pattern for improved eccentric control     Balance  Comments: Occasional CGA required to prevent posterior LOB when ambulating posteriorly to open hallway door due to decreased awareness. G-Code     OutComes Score                                                     AM-PAC Score  AM-PAC Inpatient Mobility Raw Score : 19 (02/13/20 1534)  AM-PAC Inpatient T-Scale Score : 45.44 (02/13/20 1534)  Mobility Inpatient CMS 0-100% Score: 41.77 (02/13/20 1534)  Mobility Inpatient CMS G-Code Modifier : CK (02/13/20 1534)          Goals  Short term goals  Time Frame for Short term goals: discharge  Short term goal 1: Pt will transfer sit <--> stand with mod I -ongoing  Short term goal 2: Pt will ambulate 150 ft with LRAD and mod I -ongoing  Short term goal 3: Pt will negotiate 1 flight of steps with CGA - met 2/13; GOAL UPDATED: SBA  Patient Goals   Patient goals : return home    Plan    Plan  Times per week: 5-7  Current Treatment Recommendations: Gait Training, Patient/Caregiver Education & Training, Equipment Evaluation, Education, & procurement, Balance Training, Functional Mobility Training, Transfer Training, Home Exercise Program  Safety Devices  Type of devices: Bed alarm in place, Call light within reach, Gait belt, Left in bed, Nurse notified     Therapy Time   Individual Concurrent Group Co-treatment   Time In 1330         Time Out 1353         Minutes 23              Timed Code Treatment Minutes:  23    Total Treatment Minutes:  [address] [facility], PT, DPT, CLT    This note to serve as discharge summary if patient discharged before next session.

## 2020-02-13 NOTE — DISCHARGE INSTR - MEDS
Decadron taper: Take 4 pills twice per day for 3 days, followed by 2 pills for 3 days, followed by 1 pill for 3 days.  If neurological symptoms worsen/recur then resume at 4 pills BID

## 2020-02-13 NOTE — PLAN OF CARE
Problem: Nutrition  Goal: Optimal nutrition therapy  Outcome: Ongoing  Nutrition Problem: Predicted suboptimal energy intake  Intervention: Food and/or Nutrient Delivery: Continue current diet, Start ONS  Nutritional Goals: Pt to meet >50% of nutritional requirements from diet and ONS

## 2020-02-13 NOTE — PLAN OF CARE
Problem: Falls - Risk of:  Goal: Will remain free from falls  Description  Will remain free from falls  Outcome: Ongoing  Note:   Patient will remain free from falls. Patient will use call light to notify staff of toileting needs prior to exiting the bed. Problem: Verbal Communication - Impaired:  Goal: Ability to interact with others will improve  Description  Ability to interact with others will improve  Outcome: Ongoing  Note:   Patient's ability to interact with others will continue to improve to baseline.

## 2020-02-13 NOTE — PROGRESS NOTES
Pt is alert and oriented. Denies neuro deficits besides some loss of peripheral vision in the left eye. Pt tolerating diet well, had lung biopsy site this morning, site is CD&I. Denies n/v. VSS.

## 2020-02-13 NOTE — CARE COORDINATION
Case Management Assessment            Discharge Note                    Date / Time of Note: 2/13/2020 12:14 PM                  Discharge Note Completed by: Aissatou Latif    Patient Name: Cristina Robert   YOB: 1960  Diagnosis: Brain metastases ([facility] [C79.31]  Brain metastases ([facility] [C79.31]   Date / Time: 2/11/2020  2:30 AM    Current PCP: Jarrell Ridley MD  Clinic patient: No    Hospitalization in the last 30 days: No    Advance Directives:  Code Status: Full Code  [place] DNR form completed and on chart: No    Financial:  Payor: [address] / Plan: William Lopez PPO / Product Type: *No Product type* /      Pharmacy:    [facility] [address]  [address]  [place] [address]  Phone: [phone] Fax: [phone]      Assistance purchasing medications?: Potential Assistance Purchasing Medications: Yes  Assistance provided by Case Management: None at this time    Does patient want to participate in local refill/ meds to beds program?: Yes    Meds To Beds General Rules:  1. Can ONLY be done Monday- Friday between 8:30am-5pm  2. Prescription(s) must be in pharmacy by 3pm to be filled same day  3. Copy of patient's insurance/ prescription drug card and patient face sheet must be sent along with the prescription(s)  4. Cost of Rx cannot be added to hospital bill. If financial assistance is needed, please contact unit  or ;  or  CANNOT provide pharmacy voucher for patients co-pays  5.  Patients can then  the prescription on their way out of the hospital at discharge, or pharmacy can deliver to the bedside if staff is available. (payment due at time of pick-up or delivery - cash, check, or card accepted)     Able to afford home medications/ co-pay costs: Yes    ADLS:  Current PT AM-PAC Score: 19 /24  Current OT AM-PAC Score: 18 /24      DISCHARGE Disposition: Home- No Services Needed    LOC at discharge: Not Applicable  IVANIA Completed: Not Indicated    Notification completed in HENS/PAS?:  Not Applicable    IMM Completed:   Not Indicated    Transportation:  Transportation PLAN for discharge: family   Mode of Transport: Private Car    Additional CM Notes: Pt's spouse is here to drive her home. No further needs. The Plan for Transition of Care is related to the following treatment goals of Brain metastases [facility]) [C79.31]  [facility] [C79.31]    The Patient and/or patient representative Isaac Mims and her family were provided with a choice of provider and agrees with the discharge plan Not Indicated    Freedom of choice list was provided with basic dialogue that supports the patient's individualized plan of care/goals and shares the quality data associated with the providers.  Not Indicated    Care Transitions patient: No    Dyan Segura RN  [facility] [employer]  Case Management Department  Ph: [phone]  Fax: [phone]

## 2020-02-13 NOTE — PROGRESS NOTES
Patient is A/O x4, VSS - blood pressure is soft at times, and not complaining of pain. Patient is tolerating PO liquids and diet well. Patient is ambulating to the bathroom to void with a x1 contact assist and walker. Will continue to monitor.

## 2020-02-13 NOTE — PROGRESS NOTES
Q6H    Allergies  No Known Allergies    Physical Exam  VITALS:  /75   Pulse 63   Temp 98.5 °F (36.9 °C) (Oral)   Resp 14   Ht 5' 6\" (1.676 m)   Wt 131 lb 9.8 oz (59.7 kg)   LMP 2011   SpO2 98%   BMI 21.24 kg/m²   TEMPERATURE:  Current - Temp: 98.5 °F (36.9 °C); Max - Temp  Av.2 °F (36.8 °C)  Min: 97.6 °F (36.4 °C)  Max: 98.7 °F (37.1 °C)  PULSE OXIMETRY RANGE: SpO2  Av.3 %  Min: 90 %  Max: 98 %  24HR INTAKE/OUTPUT:      Intake/Output Summary (Last 24 hours) at 2020 7969  Last data filed at 2020 2200  Gross per 24 hour   Intake 960 ml   Output --   Net 960 ml       Physical Exam  Vitals signs and nursing note reviewed. Constitutional:       General: She is not in acute distress. Appearance: She is well-developed. She is ill-appearing. She is not toxic-appearing or diaphoretic. Comments: Cachetic. HENT:      Head: Normocephalic and atraumatic. Eyes:      General: No scleral icterus. Extraocular Movements: Extraocular movements intact. Conjunctiva/sclera: Conjunctivae normal.      Pupils: Pupils are equal, round, and reactive to light. Neck:      Musculoskeletal: Normal range of motion and neck supple. Cardiovascular:      Rate and Rhythm: Normal rate and regular rhythm. Pulses: Normal pulses. Heart sounds: Normal heart sounds. No murmur. No friction rub. No gallop. Pulmonary:      Effort: Pulmonary effort is normal. No respiratory distress. Breath sounds: Normal breath sounds. No wheezing or rales. Chest:      Chest wall: No tenderness. Abdominal:      General: Abdomen is flat. Bowel sounds are normal. There is no distension. Palpations: Abdomen is soft. Tenderness: There is no abdominal tenderness. There is no guarding. Musculoskeletal: Normal range of motion. General: No swelling, tenderness or deformity. Skin:     General: Skin is warm and dry. Coloration: Skin is pale. Skin is not jaundiced. Additional Contrast? None    Result Date: 2/9/2020  EXAMINATION: CTA OF THE CHEST; CT OF THE ABDOMEN AND PELVIS WITH CONTRAST 2/9/2020 12:33 pm; 2/9/2020 12:39 pm TECHNIQUE: CTA of the chest was performed after the administration of intravenous contrast.  Multiplanar reformatted images are provided for review. MIP images are provided for review. Dose modulation, iterative reconstruction, and/or weight based adjustment of the mA/kV was utilized to reduce the radiation dose to as low as reasonably achievable.; CT of the abdomen and pelvis was performed with the administration of intravenous contrast. Multiplanar reformatted images are provided for review. Dose modulation, iterative reconstruction, and/or weight based adjustment of the mA/kV was utilized to reduce the radiation dose to as low as reasonably achievable. COMPARISON: None. HISTORY: ORDERING SYSTEM PROVIDED HISTORY: r/o PE TECHNOLOGIST PROVIDED HISTORY: Reason for exam:->r/o PE Reason for Exam: r/o PE Acuity: Unknown Type of Exam: Unknown; ORDERING SYSTEM PROVIDED HISTORY: Crampy abdominal pain with diarrhea rule out colitis versus diverticulitis TECHNOLOGIST PROVIDED HISTORY: If patient is on cardiac monitor and/or pulse ox, they may be taken off cardiac monitor and pulse ox, left on O2 if currently on. All monitors reattached when patient returns to room. Additional Contrast?->None Reason for exam:->Crampy abdominal pain with diarrhea rule out colitis versus diverticulitis Reason for Exam: crampy abd pain Acuity: Unknown Type of Exam: Unknown FINDINGS: Pulmonary Arteries: Pulmonary arteries are adequately opacified for evaluation. No evidence of intraluminal filling defect to suggest pulmonary embolism. Main pulmonary artery is normal in caliber. Mediastinum: There is a large infiltrating soft tissue mass within the left upper lobe which extends into the left hilum resulting in encasement of the left mainstem bronchus and left main pulmonary artery. Soft tissue extends into the AP window and anterior mediastinum. The overall dimensions of the mass are 13 x 9.3 cm. There is bulky adenopathy within the right hilum and subcarinal space measuring 3.6 cm and 2.3 cm respectively. Lungs/pleura: Innumerable round soft tissue masses are scattered throughout each lung. Dominant mass within the left upper lobe again measures 13 cm with the majority of remaining nodules varying between 1 and 3 cm in diameter. There is a small left pleural effusion. Organs: Numerous small simple cysts are scattered throughout the liver. Postsurgical changes of cholecystectomy are present. The remainder of the solid abdominal organs are unremarkable. Bowel: There is no bowel dilatation, wall thickening or obstruction. Pelvis: The pelvic organs and urinary bladder are grossly unremarkable. Peritoneum/retroperitoneum: There is no free air, free fluid or intraperitoneal inflammatory change. There is no adenopathy. Soft Tissues/Bones: There is no fracture or aggressive osseous lesion. Note is made the patient is status post left mastectomy and axillary dissection. 1. Negative for pulmonary embolus. 2. Infiltrating 13 cm left upper lobe bronchogenic carcinoma with extensive pulmonary metastatic disease. 3. Negative for abdominopelvic metastatic disease. Xr Chest Portable    Result Date: 2/13/2020  Portable chest HISTORY: Lung biopsy. COMPARISON: February 13, 2020. FINDINGS: There has been no interval change since prior examination. No pneumothorax is seen. No pneumothorax. Xr Chest Portable    Result Date: 2/13/2020  Portable chest HISTORY: Lung biopsy. COMPARISON: February 9, 2020. FINDINGS: Innumerable focal pulmonary nodular opacities are identified. There is a dominant opacity at the left lung apex. There is no visible pneumothorax. No postprocedural pneumothorax.     Xr Chest Portable    Result Date: 2/9/2020  EXAMINATION: ONE XRAY VIEW OF THE CHEST 2/9/2020 11:23 am COMPARISON: None. HISTORY: ORDERING SYSTEM PROVIDED HISTORY: fatigue/anemia TECHNOLOGIST PROVIDED HISTORY: Reason for exam:->fatigue/anemia Reason for Exam: fatigue/anemia Acuity: Acute Type of Exam: Initial Relevant Medical/Surgical History: breast cancer (left breast) FINDINGS: Heart size is normal.  There is a large area opacification in the left apex. Numerous nodules are noted throughout both lungs. There is also fullness in the right hilar region suggesting hilar adenopathy. Multiple surgical clips project in the left chest wall and over the left hemithorax. Large consolidated opacity left apex. This is indeterminate and may represent pneumonia, however underlying mass lesion is not excluded. CT chest with contrast recommended Numerous lung nodules suggesting diffuse metastatic disease. Ct Chest Pulmonary Embolism W Contrast    Result Date: 2/9/2020  EXAMINATION: CTA OF THE CHEST; CT OF THE ABDOMEN AND PELVIS WITH CONTRAST 2/9/2020 12:33 pm; 2/9/2020 12:39 pm TECHNIQUE: CTA of the chest was performed after the administration of intravenous contrast.  Multiplanar reformatted images are provided for review. MIP images are provided for review. Dose modulation, iterative reconstruction, and/or weight based adjustment of the mA/kV was utilized to reduce the radiation dose to as low as reasonably achievable.; CT of the abdomen and pelvis was performed with the administration of intravenous contrast. Multiplanar reformatted images are provided for review. Dose modulation, iterative reconstruction, and/or weight based adjustment of the mA/kV was utilized to reduce the radiation dose to as low as reasonably achievable. COMPARISON: None.  HISTORY: ORDERING SYSTEM PROVIDED HISTORY: r/o PE TECHNOLOGIST PROVIDED HISTORY: Reason for exam:->r/o PE Reason for Exam: r/o PE Acuity: Unknown Type of Exam: Unknown; ORDERING SYSTEM PROVIDED HISTORY: Crampy abdominal pain with diarrhea rule out colitis versus diverticulitis TECHNOLOGIST PROVIDED HISTORY: If patient is on cardiac monitor and/or pulse ox, they may be taken off cardiac monitor and pulse ox, left on O2 if currently on. All monitors reattached when patient returns to room. Additional Contrast?->None Reason for exam:->Crampy abdominal pain with diarrhea rule out colitis versus diverticulitis Reason for Exam: crampy abd pain Acuity: Unknown Type of Exam: Unknown FINDINGS: Pulmonary Arteries: Pulmonary arteries are adequately opacified for evaluation. No evidence of intraluminal filling defect to suggest pulmonary embolism. Main pulmonary artery is normal in caliber. Mediastinum: There is a large infiltrating soft tissue mass within the left upper lobe which extends into the left hilum resulting in encasement of the left mainstem bronchus and left main pulmonary artery. Soft tissue extends into the AP window and anterior mediastinum. The overall dimensions of the mass are 13 x 9.3 cm. There is bulky adenopathy within the right hilum and subcarinal space measuring 3.6 cm and 2.3 cm respectively. Lungs/pleura: Innumerable round soft tissue masses are scattered throughout each lung. Dominant mass within the left upper lobe again measures 13 cm with the majority of remaining nodules varying between 1 and 3 cm in diameter. There is a small left pleural effusion. Organs: Numerous small simple cysts are scattered throughout the liver. Postsurgical changes of cholecystectomy are present. The remainder of the solid abdominal organs are unremarkable. Bowel: There is no bowel dilatation, wall thickening or obstruction. Pelvis: The pelvic organs and urinary bladder are grossly unremarkable. Peritoneum/retroperitoneum: There is no free air, free fluid or intraperitoneal inflammatory change. There is no adenopathy. Soft Tissues/Bones: There is no fracture or aggressive osseous lesion. Note is made the patient is status post left mastectomy and axillary dissection. examination. The patient tolerated the procedure without difficulty. No immediate competitions Hemostasis achieved with manual compression CT examination performed after the biopsy demonstrates no pneumothorax. The patient will be observed on the floor after the examination. Satisfactory CT-guided biopsy of left upper lobe mass. Mri Brain W Wo Contrast    Result Date: 2/10/2020  EXAMINATION: MRI OF THE BRAIN WITHOUT AND WITH CONTRAST  2/10/2020 7:32 pm TECHNIQUE: Multiplanar multisequence MRI of the head/brain was performed without and with the administration of intravenous contrast. COMPARISON: None. HISTORY: ORDERING SYSTEM PROVIDED HISTORY: Lung cancer, headache TECHNOLOGIST PROVIDED HISTORY: Reason for exam:->Lung cancer, headache Reason for Exam: New lung cancer diagnosis. History of breast cancer 2017, mastectomy, no chemo/radiation. 2008 colon cancer, bowel resection. headaches, weakness. Multihance 10ml.  gfr>60 FINDINGS: INTRACRANIAL STRUCTURES/VENTRICLES:  There is no acute infarct. Generalized involutional change brain parenchyma identified with prominent of the ventricles and sulci sulci. There is mild sulcal effacement identified involving right cerebral hemisphere and the cerebellar hemispheres. This causes right-to-left midline shift measuring 9 mm. Evidence of multiple intracranial metastases identified with surrounding vasogenic edema. Large metastasis within the right parieto-occipital lobe measures 2.5 x 2.0 cm in size with central areas of thick internal septations and central hypoenhanced suggestive of necrosis. Additional metastasis identified involving the right parasagittal posterior frontal lobe 0.5 x 3.5 cm. Multiple intensities identified with the posterior fossa, the largest within the left median cerebral hemisphere 1.9 x 1.6 cm in size. Two ring-enhancing lesions identified within the right cerebral hemisphere 4 mm and 4 mm respectively.  There is partial effacement of the 4th ventricle. There is radiopaque effacement of the occipital horn right lateral ventricle. Partial effacement of the left lateral ventricle noted. There is posterior indentation of the darrell and midbrain due to the vasogenic edema and the right-sided metastases. The sellar/suprasellar regions appear unremarkable. The normal signal voids within the major intracranial vessels appear maintained. ORBITS: The visualized portion of the orbits demonstrate no acute abnormality. SINUSES: Mild ethmoid sinus mucosal thickening. BONES/SOFT TISSUES: The bone marrow signal intensity appears normal. The soft tissues demonstrate no acute abnormality. Metastases within the supratentorial and infratentorial brain measuring up to 2.5 cm greatest dimension. This is associated with mass effect and midline shift measuring up to 9 mm. Critical results were called by Dr. Moraima Acevedo to Dr Uvaldo Chen on 2/10/2020 at 20:47. Pathology  Department of Pathology  FINAL CYTOLOGY REPORT  Patient Name: Justina Molina            Accession No:  [id number]   Age Sex:   1960    59 Y / F       Location:      [facility]  Account No:   [de-identified]                  Collected:     02/10/2020  Med Rec No:    [id number]                 Received:      02/10/2020  Attend Phys: Tawny Whalen MD       Completed:     2020  Perform Phys: Tomas Cheng MD            FINAL DIAGNOSIS:    A. Lung, left upper lobe, endobronchial ultrasound guided fine needle  aspiration:       -  Positive for malignancy, compatible with non-small cell carcinoma  - see note     Note: Specimen A reveals abundant atypical cells in cohesive nests on  the smeared slides, compatible with non-small cell carcinoma.  Atypical  cells are not present in the cell block, precluding further  characterization of the malignant cells or assessment of possible sites  of origin.  Clinical correlation is recommended.     B. Lung, left upper lobe, bronchial alveolar lavage:       -  No malignant cells identified       -  Rare fungal hyphae on the liquid monolayer slide - see note       - GMS staining with appropriate controls performed on the cell block  reveals no additional fungal organisms     Note: The liquid monolayer slide contains one small focus with branching  fungal hyphae.  Definitive septations and yeast forms are not identified.   No additional organisms are identified in the cell block section,  including the GMS stained sections.  It is unclear if this represents a  pathogenic organism or contamination.  Correlation with clinical findings  and microbiology studies is recommended. C. Lung, left upper lobe, endobronchial ultrasound guided fine needle  aspiration:       -  No malignant cells identified       - GMS special staining performed on the cell block with appropriate  controls reveals no fungal organisms    D.  Lymph node, right hilar, endobronchial ultrasound guided fine needle  aspiration:       -  No malignant cells identified       - GMS special staining performed on the cell block with appropriate  controls reveals no fungal organisms      KIRSE/KIRSE    Problem List  Patient Active Problem List   Diagnosis    Foot pain    Closed fracture of cuboid bone of foot    Malignant neoplasm of left female breast (HCC)    Generalized weakness    Mild malnutrition (HCC)    Cavitating mass in left upper lung lobe    Pulmonary metastases (HCC)    Hoarseness of voice    Former smoker    Pleural effusion on left    Brain metastases (HCC)    Malignant neoplasm of upper lobe of left lung (HCC)       Assessment and Plan:   60year old female with prior history of colon adenocarcinoma s/p resection and \"triple negative\" grade 3 IDC s/p mastectomy of the left breast who was admitted for new findings of metastatic disease with multiple lung and brain masses.      - Bronchoscopy was performed on 2/9/2020 at [facility] with cytology showing non-small cell lung carcinoma NOS. However, this is not adequate to establish histology or molecular diagnostics. We discussed with IR today, she is going to get CT guided lung biopsy today for pathology, additional molecular testing, NexGen sequencing, TMB, PDL1.  - Given the extent of her disease and smoking history, systemic chemotherapy with I/O treatment is anticipated. After our discussion this morning about logistics, benefit, and toxicity, the patient expressed that she wishes to pursue chemotherapy with close support from her daughter and friends. ECOG 2-3.   - For her brain mets, she was evaluated by Neurosurgery and Radiation oncology. She currently receiving palliative radiation with 5 planned fractions. She has completed 2 fractions of 5 treatments. Reports improving in symptoms such as headache and nausea. She is continued to be on Decadron and Keppra for prophylaxis as per Neurosurgery recommendations. Sherrlyn Gowers, MD, PGY1  2/13/2020   9:28 AM    The patient was staffed and discussed with attending physician, Dr. Vicente Ramirez. Attending:    Patient seen and examined. Data reviewed. Case discussed with Dr. Mandi Hale on morning rounds and agree with her progress note with my modification. The patient has a relationship with Dr. Alyssa Alfredo in the [place] area. She can follow-up with him in the outpatient setting.     Joellen Kanner, MD

## 2020-02-13 NOTE — DISCHARGE SUMMARY
[address] [place] [address]  DISCHARGE SUMMARY    Patient ID: Jeffie Castleman      Patient's PCP: Swapna Lloyd MD    Admit Date: 2/11/2020     Discharge Date: 2/13/20    Disposition:  Home    Admitting Physician: Fermin Gustafson MD     Discharge Physician: Alexa Hudson MD     ADMISSION DIAGNOSES:  Present on Admission:   Brain metastases ([address]   Malignant neoplasm of left female breast ([address]   Pulmonary metastases ([address]   Hoarseness of voice   Former smoker   Malignant neoplasm of upper lobe of left lung ([address]      DISCHARGE DIAGNOSES:  Active Hospital Problems    Diagnosis Date Noted    Malignant neoplasm of upper lobe of left lung ([address] [C34.12] 02/12/2020    Brain metastases ([address] [C79.31] 02/10/2020    Pulmonary metastases ([address] [C78.00] 02/10/2020    Hoarseness of voice [R49.0] 02/10/2020    Former smoker [Z87.891] 02/10/2020    Malignant neoplasm of left female breast ([address] [C50.912]      Patient Active Problem List   Diagnosis    Foot pain    Closed fracture of cuboid bone of foot    Malignant neoplasm of left female breast ([address]    Generalized weakness    Mild malnutrition ([address]    Cavitating mass in left upper lung lobe    Pulmonary metastases ([address]    Hoarseness of voice    Former smoker    Pleural effusion on left    Brain metastases ([address]    Malignant neoplasm of upper lobe of left lung ([address]       The patient was seen and examined on day of discharge and this discharge summary is in conjunction with any daily progress note from day of discharge. Hospital Course:   Jeffie Castleman is a pleasant 60year old F with PMH of colon cancer s/p resection in 2008, breast cancer s/p resection in 2017, and osteoarthritis. She initially presented to [facility] emergency department on 2/9 with the complaint of worsening fatigue and headaches over the past month associated with a 15-25 lb weight loss and hoarseness in voice.  She was seen by her PCP and was found to be anemic and was started on iron replacement. She also complained of shortness of breath and endorsed an episode of melena. CT abdomen/pelvis showed an infiltrating 13 cm left upper lobe bronchogenic carcinoma with extensive pulmonary metastases. Patient had bronchoscopy; results pending. MRI brain revealed metastases within the supratentorial and infratentorial brain measuring up to 2.5 cm greatest dimension associated with mass effect and midline shift measuring up to 9 mm. Neuro surgery consulted who recommended transfer to [facility] ICU for neuro checks. Patient seen and examined at bedside. She doesn't complain of any pain or discomfort. She complains of weakness and fatigue. She also gets occasional headache which usually resolved with tylenol. During the course of her stay, the following issues were addressed:    Bronchogenic carcinoma of the left lung: new 13cm left upper lobe mass seen on CT abdomen/pelvis. Bronchoscopy on 2/9 preliminary positive for non-small cell lung cancer. 2/13 CT guided biopsy of lung complered; pending results. - follow up outpatient with oncology     Brain metastases: brain MRI showed metastases within supratentorial and infratentorial brain measuring up to 2.5 cm in dimension, associated with mass effect and midline shift up to 9 mm. CT Head with 6 mm R to L midline shift  -- [id number] 2 WL Z4R will taper outpatient  -- Keppra 500 BID continued outpatinet  -- Protonix 40 PO  -- continue lipitor 80 mg  -- Neurosurg consulted: no neurosurg intervention required, Keppra, Decadron, q4h neurochecks  -- Radiation Oncology consulted: received 1/5 whole brain treatments 2/11, will continue daily x5. Continued outpatient     Microcytic anemia: H/H 9.9/31.8, stable. Iron and TIBC low. Recently started iron replacement as an outpatient. FOBT negative. Episode of dark diarrhea likely 2/2 iron replacement therapy.   -- stabilized  -- Continued protonix 40 mg PO.  -- Continued ferrous sulfate 325 mg daily.      History of ductal carcinoma in situ   s/p left mastectomy in 2017     History of colon cancer   s/p bowel resection in 2008      Physical Exam Performed:    /65   Pulse 69   Temp 98 °F (36.7 °C) (Oral)   Resp 16   Ht 5' 6\" (1.676 m)   Wt 131 lb 9.8 oz (59.7 kg)   LMP 06/01/2011   SpO2 93%   BMI 21.24 kg/m²     Physical Exam   Constitutional:       General: She is not in acute distress.     Appearance: Normal appearance. She is not ill-appearing, toxic-appearing or diaphoretic. HENT:      Head: Normocephalic and atraumatic.      Mouth/Throat:      Mouth: Mucous membranes are moist.   Eyes:      Extraocular Movements: Extraocular movements intact.      Pupils: Pupils are equal, round, and reactive to light. Cardiovascular:      Rate and Rhythm: Normal rate and regular rhythm.      Heart sounds: No murmur. No friction rub. No gallop.    Pulmonary:      Effort: Pulmonary effort is normal. No respiratory distress.      Breath sounds: Normal breath sounds. No stridor. No wheezing, rhonchi or rales. Abdominal:      General: Abdomen is flat. There is no distension.      Tenderness: There is no abdominal tenderness. Musculoskeletal:      Right lower leg: No edema.      Left lower leg: No edema. Skin:     General: Skin is warm and dry. Neurological:      General: No focal deficit present.      Mental Status: She is alert and oriented to person, place, and time.      Cranial Nerves: No cranial nerve deficit.      Sensory: No sensory deficit.      Motor: No weakness.       Significant Diagnostic Studies    Labs:   For convenience and continuity at follow-up the following most recent labs are provided:    CBC:    Lab Results   Component Value Date    WBC 9.6 02/13/2020    HGB 9.7 02/13/2020    HCT 30.6 02/13/2020     02/13/2020       Renal:    Lab Results   Component Value Date     02/13/2020    K 4.7 02/13/2020    K 4.4 02/10/2020    CL 99 02/13/2020    CO2 23 02/13/2020    BUN 13 02/13/2020    CREATININE <0.5 02/13/2020    CALCIUM 9.1 02/13/2020       Radiology:   XR CHEST PORTABLE   Final Result   No pneumothorax. CT NEEDLE BIOPSY LUNG PERCUTANEOUS   Final Result      Satisfactory CT-guided biopsy of left upper lobe mass. XR CHEST PORTABLE   Final Result      No postprocedural pneumothorax. CT head without contrast   Final Result       6 mm right-to-left midline shift. No metastasis in the right parietal    lobe and left cerebellum with surrounding vasogenic edema. No hemorrhage. Consults:     IP CONSULT TO NEUROSURGERY  IP CONSULT TO PHARMACY  IP CONSULT TO CRITICAL CARE  IP CONSULT TO ONCOLOGY  IP CONSULT TO PALLIATIVE CARE  IP CONSULT TO RADIATION ONCOLOGY        Condition at Discharge: Stable    Discharge Instructions/Follow-up:    Primary Care Physician in one week  See dc instructions on chart      Activity: activity as tolerated    Diet: DIET GENERAL;  Dietary Nutrition Supplements: Standard High Calorie Oral Supplement       Discharge Medications:        Medication List      START taking these medications    atorvastatin 80 MG tablet  Commonly known as:  LIPITOR  Take 1 tablet by mouth nightly     dexamethasone 0.5 MG tablet  Commonly known as:  DECADRON  Take 4 pills twice per day for 3 days, followed by 2 pills for 3 days, followed by 1 pill for 3 days. If neurological symptoms worsen/recur then resume at 4 pills BID     levETIRAcetam 500 MG tablet  Commonly known as:  KEPPRA  Take 1 tablet by mouth every 12 hours     pantoprazole 40 MG tablet  Commonly known as:  PROTONIX  Take 1 tablet by mouth every morning (before breakfast)  Start taking on:  February 14, 2020        CONTINUE taking these medications    ferrous sulfate 325 (65 Fe) MG tablet        STOP taking these medications    amoxicillin 250 MG capsule  Commonly known as:  AMOXIL         Medication Instructions:      Decadron taper:  Take 4 pills twice per day for 3 days,

## 2020-02-13 NOTE — PROGRESS NOTES
NUTRITION ASSESSMENT  Admission Date: 2/11/2020     Type and Reason for Visit: Positive Nutrition Screen, Initial    NUTRITION RECOMMENDATIONS:   1. PO Diet: Continue current general diet and encourage intake. 2. ONS: Add Standard/ High Ortiz BID    NUTRITION ASSESSMENT:  Pt with PMHx of colon cancer s/p resection in 2008, breast cancer, and osteoarthritis admitted for worsening fatigue and headaches over the past month associated with a 15-25 lb weight loss. Assessed d/t MSTS 3. Pt is at nutritional compromise given decreased appetite and minimal PO intake x4 weeks PTA d/t not feeling too well. Pt reports 11 lb (8.5%) weight loss ( lbs) but is unsure of time period of weight loss. Pt states she feels like her appetite is improving and noted to have ~75% of breakfast this AM. Pt agreeable to have ONS BID to aid with meeting nuritional requirements this admission and to prevent further weight loss. Will monitor for adequate PO intake and tolerance to supplement. MALNUTRITION ASSESSMENT  Context: Acute illness or injury   Malnutrition Status:  At risk for malnutrition  Findings of the 6 clinical characteristics of malnutrition (Minimum of 2 out of 6 clinical characteristics is required to make the diagnosis of moderate or severe Protein Calorie Malnutrition based on AND/ASPEN Guidelines):  Energy Intake %: Less than or equal to 75% of estimated energy requirement  Energy Intake Time: Greater than or equal to 1 month  Interpretation of Weight Loss %: 7.5% loss or greater  Interpretation of Weight Loss Time: unable to assess(Stated)  Body Fat Status: No significant subcutaneous fat loss     Muscle Mass Status: Mild muscle mass loss  Muscle Mass Loss Location: Temples (temporalis muscle), Clavicles (pectoralis and deltoids)  Fluid Accumulation Status: No significant fluid accumulation     Reduced  Strength: Not measured    NUTRITION DIAGNOSIS   Problem: Inadequate Oral Intake  Etiology: Decreased ability PO Meals Eaten (%): 76 - 100%   PO Intake: 51-75%  PO Supplement: None   PO Supplement Intake: n/a          NUTRITION RISK LEVEL: Risk Level:  Moderate    Santhosh Cyr RD, LD  [place]:  [phone]  Office:  [phone]

## 2020-03-16 LAB
FUNGUS (MYCOLOGY) CULTURE: NORMAL
FUNGUS STAIN: NORMAL

## 2020-03-31 LAB
AFB CULTURE (MYCOBACTERIA): NORMAL
AFB SMEAR: NORMAL

## 2022-12-05 NOTE — PROGRESS NOTES
Pt awake and alert. RA, VSS. Family at bedside. Pt denies pain and nausea, tolerating PO. Pt meets criteria to be discharged from phase 1. [Fair] :  ~his/her~ mood as fair [Yes] : Yes [YearQuit] : quit 40 years ago [de-identified] : social [de-identified] : Minimal exercise

## 2025-06-02 NOTE — PLAN OF CARE
Intervention: Speech Evaluation/treatment  SLP completed evaluation. Please refer to notes in EMR. Yes

## (undated) DEVICE — GOWN AURORA NONREINF LG: Brand: MEDLINE INDUSTRIES, INC.

## (undated) DEVICE — 60 ML SYRINGE,REGULAR TIP: Brand: MONOJECT

## (undated) DEVICE — CONMED CHANNEL MASTER PULMONARY AND PEDIATRIC CLEANING BRUSH, 160 CM X 2.0 MM: Brand: CONMED

## (undated) DEVICE — Device: Brand: MEDEX

## (undated) DEVICE — MACROBORE EXTN SET W/ 1 SMRTSITE NEEDLE-FREE VLV PRT

## (undated) DEVICE — DISPOSABLE CYTOLOGY BRUSH: Brand: DISPOSABLE CYTOLOGY BRUSH

## (undated) DEVICE — SINGLE USE BIOPSY VALVE MAJ-210: Brand: SINGLE USE BIOPSY VALVE (STERILE)

## (undated) DEVICE — NEEDLE ASPIR 19GA HISTOLOGY FLX VIZISHOT 2

## (undated) DEVICE — PROCEDURE KIT ENDOSCP CUST

## (undated) DEVICE — Device: Brand: BALLOON

## (undated) DEVICE — SYRINGE MED 10ML POLYPR LUERSLIP TIP FLAT TOP W/O SFTY DISP

## (undated) DEVICE — SINGLE USE SUCTION VALVE MAJ-209: Brand: SINGLE USE SUCTION VALVE (STERILE)